# Patient Record
Sex: MALE | Race: WHITE | NOT HISPANIC OR LATINO | Employment: FULL TIME | URBAN - METROPOLITAN AREA
[De-identification: names, ages, dates, MRNs, and addresses within clinical notes are randomized per-mention and may not be internally consistent; named-entity substitution may affect disease eponyms.]

---

## 2020-12-14 ENCOUNTER — HOSPITAL ENCOUNTER (OUTPATIENT)
Facility: MEDICAL CENTER | Age: 25
End: 2020-12-14
Attending: PHYSICIAN ASSISTANT
Payer: COMMERCIAL

## 2020-12-14 ENCOUNTER — OFFICE VISIT (OUTPATIENT)
Dept: URGENT CARE | Facility: CLINIC | Age: 25
End: 2020-12-14
Payer: COMMERCIAL

## 2020-12-14 VITALS
HEIGHT: 75 IN | BODY MASS INDEX: 27.35 KG/M2 | SYSTOLIC BLOOD PRESSURE: 114 MMHG | DIASTOLIC BLOOD PRESSURE: 72 MMHG | OXYGEN SATURATION: 98 % | WEIGHT: 220 LBS | HEART RATE: 97 BPM | TEMPERATURE: 98.5 F | RESPIRATION RATE: 16 BRPM

## 2020-12-14 DIAGNOSIS — Z20.822 SUSPECTED COVID-19 VIRUS INFECTION: ICD-10-CM

## 2020-12-14 DIAGNOSIS — J98.8 VIRAL RESPIRATORY ILLNESS: ICD-10-CM

## 2020-12-14 DIAGNOSIS — Z20.822 EXPOSURE TO COVID-19 VIRUS: ICD-10-CM

## 2020-12-14 DIAGNOSIS — B97.89 VIRAL RESPIRATORY ILLNESS: ICD-10-CM

## 2020-12-14 PROCEDURE — 99203 OFFICE O/P NEW LOW 30 MIN: CPT | Mod: CS | Performed by: PHYSICIAN ASSISTANT

## 2020-12-14 PROCEDURE — U0003 INFECTIOUS AGENT DETECTION BY NUCLEIC ACID (DNA OR RNA); SEVERE ACUTE RESPIRATORY SYNDROME CORONAVIRUS 2 (SARS-COV-2) (CORONAVIRUS DISEASE [COVID-19]), AMPLIFIED PROBE TECHNIQUE, MAKING USE OF HIGH THROUGHPUT TECHNOLOGIES AS DESCRIBED BY CMS-2020-01-R: HCPCS

## 2020-12-14 RX ORDER — BENZONATATE 100 MG/1
200 CAPSULE ORAL 3 TIMES DAILY PRN
Qty: 60 CAP | Refills: 0 | Status: SHIPPED | OUTPATIENT
Start: 2020-12-14 | End: 2021-01-26

## 2020-12-14 ASSESSMENT — ENCOUNTER SYMPTOMS
CHILLS: 0
FEVER: 0
HEADACHES: 1
SORE THROAT: 1
SHORTNESS OF BREATH: 1

## 2020-12-14 NOTE — LETTER
December 14, 2020         Patient: Remington Edmonds   YOB: 1995   Date of Visit: 12/14/2020    Your employee was seen in our clinic today.  A concern for COVID-19 has been identified and testing is in progress. We are asking you to excuse absences while following self-isolation protocol per Center for Disease Control (CDC) guidelines.  Your employee will be able to access test results through our electronic delivery system called [employer].     If the results of testing are positive, your employee should follow the CDC guidelines.  These are isolation for a minimum of 10 days and at least 24 hours have passed since your last fever without the use of fever-reducing medications and all other symptoms have improved.  The health department may contact you and provide further directions regarding self-isolation and return to work.     Negative result without close contact*:  If your employee was tested due to their symptoms without close contact to someone with COVID-19 and their test is negative: your employee should not return to work or regular activities until 24 hours after symptoms fully improve. (For example, if patient feels back to normal on Tuesday, should remain isolated through Wednesday).      Negative with close contact*:  If your employee was tested due to close contact to someone, they should self isolate for 14 days after their exposure.    Negative with positive household member  If your employee was due to a positive household member they should still quarantine for a period of 14 days.  The 14 day period begins once the household member is isolated. If unable to quarantine (for example mom from infant), the CDC advises an additional 14-day quarantine period from the COVID-19 household member.   In general, repeat testing is not necessary and not offered through our [facility].     This is the only note that will be provided from [facility] for this visit.  Your employee will  require an appointment with a primary care provider if FMLA or disability forms are required.  If you have any questions please do not hesitate to call me at the phone number listed below.    Sincerely,    YVONNE Perez.-C.  [phone]

## 2020-12-14 NOTE — PATIENT INSTRUCTIONS
INSTRUCTIONS FOR COVID-19 OR ANY OTHER INFECTIOUS RESPIRATORY ILLNESSES    The Centers for Disease Control and Prevention (CDC) states that early indications for COVID-19 include cough, shortness of breath, difficulty breathing, or at least two of the following symptoms: chills, shaking with chills, muscle pain, headache, sore throat, and loss of taste or smell. Symptoms can range from mild to severe and may appear up to two weeks after exposure to the virus.    The practice of self-isolation and quarantine helps protect the public and your family by  preventing exposure to people who have or may have a contagious disease. Please follow the prevention steps below as based on CDC guidelines:    WHEN TO STOP ISOLATION: Persons with COVID-19 or any other infectious respiratory illness who have symptoms and were advised to care for themselves at home may discontinue home isolation under the following conditions:  · At least 24 hours have passed since recovery defined as resolution of fever without the use of fever-reducing medications; AND,  · Improvement in respiratory symptoms (e.g., cough, shortness of breath); AND,  · At least 10 days have passed since symptoms first appeared and have had no subsequent illness.    MONITOR YOUR SYMPTOMS: If your illness is worsening, seek prompt medical attention. If you have a medical emergency and need to call 911, notify the dispatch personnel that you have, or are being evaluated for confirmed or suspected COVID-19 or another infectious respiratory illness. Wear a facemask if possible.    ACTIVITY RESTRICTION: restrict activities outside your home, except for getting medical care. Do not go to work, school, or public areas. Avoid using public transportation, ride-sharing, or taxis.    SCHEDULED MEDICAL APPOINTMENTS: Notify your provider that you have, or are being evaluated for, confirmed or suspected COVID-19 or another infectious respiratory. This will help the healthcare  provider’s office safely take care of you and keep other people from getting exposed or infected.    FACEMASKS, when to wear: Anytime you are away from your home or around other people or pets. If you are unable to wear one, maintain a minimum of 6 feet distancing from others.    LIVING ENVIRONMENT: Stay in a separate room from other people and pets. If possible, use a separate bathroom, have someone else care for your pets and avoid sharing household items. Any items used should be washed thoroughly with soap and water. Clean all “high-touch” surfaces every day. Use a household cleaning spray or wipe, according to the label instructions. High touch surfaces include (but are not limited to) counters, tabletops, doorknobs, bathroom fixtures, toilets, phones, keyboards, tablets, and bedside tables.     HAND WASHING: Frequently wash hands with soap and water for at least 20 seconds,  especially after blowing your nose, coughing, or sneezing; going to the bathroom; before and after interacting with pets; and before and after eating or preparing food. If hands are visibly dirty use soap and water. If soap and water are not available, use an alcohol-based hand  with at least 60% alcohol. Avoid touching your eyes, nose, and mouth with unwashed hands. Cover your coughs and sneezes with a tissue. Throw used tissues in a lined trash can. Immediately wash your hands.    ACTIVE/FACILITATED SELF-MONITORING: Follow instructions provided by your local health department or health professionals, as appropriate. When working with your local health department check their available hours.    [facility]   Phone Number   [facility] [phone]   [facility] [place] [phone]   [place] [address]   [place] [phone]     IF YOU HAVE CONFIRMED POSITIVE COVID-19:    Those who have completely recovered from COVID-19 may have immune-boosting antibodies in their plasma--called “convalescent plasma”--that could be  used to treat critically ill COVID19 patients.    [facility] is excited to begin working with Sarah on collecting convalescent plasma from  people who have recovered from COVID-19 as part of a program to treat patients infected with the virus. This FDA-approved “emergency investigational new drug” is a special blood product containing antibodies that may give patients an extra boost to fight the virus.    To be eligible to donate convalescent plasma, you must have a prior COVID-19 diagnosis documented by a laboratory test (or a positive test result for SARS-CoV-2 antibodies) and meet additional eligibility requirements.    If you are interested in donating convalescent plasma or have any additional questions, please contact the [facility] Convalescent Plasma  at [phone] or via e-mail at [email or web address].

## 2020-12-14 NOTE — PROGRESS NOTES
"Subjective:     Remington Edmonds  is a 24 y.o. male who presents for Headache (positive exporsure), Pharyngitis, and Shortness of Breath    This is a new problem. Patient presents to urgent care with sudden onset this morning of sore throat and mild shortness of breath. Patient denies fever or chills, loss of taste or smell, cough, nausea, vomiting or diarrhea. Patient has had a direct exposure to a coworker who is positive for COVID-19.     Headache   Associated symptoms include a sore throat. Pertinent negatives include no fever.   Pharyngitis   Associated symptoms include headaches and shortness of breath.   Shortness of Breath  Associated symptoms include headaches and a sore throat. Pertinent negatives include no fever.         Review of Systems   Constitutional: Negative for chills, fever and malaise/fatigue.   HENT: Positive for sore throat.    Respiratory: Positive for shortness of breath.    Neurological: Positive for headaches.   All other systems reviewed and are negative.    No Known Allergies  Past medical history, family history, surgical history and social history are reviewed and updated in the record today.     Objective:   /72   Pulse 97   Temp 36.9 °C (98.5 °F) (Temporal)   Resp 16   Ht 1.905 m (6' 3\")   Wt 99.8 kg (220 lb)   SpO2 98%   BMI 27.50 kg/m²   Physical Exam  Vitals signs reviewed.   Constitutional:       Appearance: He is well-developed. He is not ill-appearing or toxic-appearing.   HENT:      Head: Normocephalic and atraumatic.      Right Ear: Tympanic membrane, ear canal and external ear normal.      Left Ear: Tympanic membrane, ear canal and external ear normal.      Nose: Nose normal.      Mouth/Throat:      Lips: Pink.      Mouth: Mucous membranes are moist.      Pharynx: Uvula midline. Posterior oropharyngeal erythema present. No oropharyngeal exudate.      Tonsils: No tonsillar exudate. 1+ on the right. 1+ on the left.   Eyes:      Conjunctiva/sclera: Conjunctivae " normal.      Pupils: Pupils are equal, round, and reactive to light.   Neck:      Musculoskeletal: Normal range of motion and neck supple.   Cardiovascular:      Rate and Rhythm: Normal rate and regular rhythm.      Heart sounds: Normal heart sounds. No murmur. No friction rub.   Pulmonary:      Effort: Pulmonary effort is normal. No respiratory distress.      Breath sounds: Normal breath sounds.   Abdominal:      General: Bowel sounds are normal.      Palpations: Abdomen is soft.      Tenderness: There is no abdominal tenderness.   Musculoskeletal: Normal range of motion.   Lymphadenopathy:      Head:      Right side of head: No submental, submandibular or tonsillar adenopathy.      Left side of head: No submental, submandibular or tonsillar adenopathy.      Cervical: No cervical adenopathy.      Upper Body:      Right upper body: No supraclavicular adenopathy.      Left upper body: No supraclavicular adenopathy.   Skin:     General: Skin is warm and dry.      Findings: No rash.   Neurological:      Mental Status: He is alert and oriented to person, place, and time.      Cranial Nerves: Cranial nerves are intact. No cranial nerve deficit.      Sensory: Sensation is intact. No sensory deficit.      Motor: Motor function is intact.      Coordination: Coordination is intact. Coordination normal.      Gait: Gait is intact.   Psychiatric:         Attention and Perception: Attention normal.         Mood and Affect: Mood normal.         Speech: Speech normal.         Behavior: Behavior normal.         Thought Content: Thought content normal.         Judgment: Judgment normal.          Assessment/Plan:   1. Viral respiratory illness  - benzonatate (TESSALON) 100 MG Cap; Take 2 Caps by mouth 3 times a day as needed.  Dispense: 60 Cap; Refill: 0  - COVID/SARS COV-2 PCR; Future    2. Suspected COVID-19 virus infection  - COVID/SARS COV-2 PCR; Future    3. Exposure to COVID-19 virus  - COVID/SARS COV-2 PCR; Future        Testing performed for COVID-19.  Patient/guardian is given printed /MyChart instructions regarding self-isolation.  Work/school note is provided with specific return to work/school protocols.  Reviewed with patient/guardian that if they do test positive they will be contacted by their local health department regarding return to work/school protocols.  Results will also be released to patient/guardian in MyChart or called to the patient/guardian directly.  Encouraged mask use, frequent handwashing, wiping down hard surfaces, etc.    Nasal saline rinse  Over-the-counter Flonase nasal spray per 's instructions  Mucinex as needed    Increase fluids, rest.  Patient may use salt water gargles, ice pops, cool fluids.    May use Tylenol or ibuprofen over-the-counter as needed for pain or fever not to exceed recommended daily dose.      Patient is given a contingency prescription for Tessalon Perles if needed for cough.    Upon entering exam room I ensured patient was wearing a mask.  This provider wore appropriate PPE throughout entire visit.  Patient wore mask entire visit except for a brief period while examining oropharynx.    Differential diagnosis, natural history, supportive care, and indications for immediate follow-up discussed.  Red flag warning symptoms and strict ER/follow-up precautions given.  The patient demonstrated a good understanding and agreed with the treatment plan.  Please note that this note was created using voice recognition speech to text software. Every effort has been made to correct obvious errors.  However, I expect there are errors of grammar and possibly context that were not discovered prior to finalizing the note  CECIL Ramos PA-C

## 2020-12-15 DIAGNOSIS — J98.8 VIRAL RESPIRATORY ILLNESS: ICD-10-CM

## 2020-12-15 DIAGNOSIS — B97.89 VIRAL RESPIRATORY ILLNESS: ICD-10-CM

## 2020-12-15 DIAGNOSIS — Z20.822 EXPOSURE TO COVID-19 VIRUS: ICD-10-CM

## 2020-12-15 DIAGNOSIS — Z20.822 SUSPECTED COVID-19 VIRUS INFECTION: ICD-10-CM

## 2020-12-15 LAB
COVID ORDER STATUS COVID19: NORMAL
SARS-COV-2 RNA RESP QL NAA+PROBE: NOTDETECTED
SPECIMEN SOURCE: NORMAL

## 2021-01-08 ENCOUNTER — OFFICE VISIT (OUTPATIENT)
Dept: URGENT CARE | Facility: CLINIC | Age: 26
End: 2021-01-08
Payer: COMMERCIAL

## 2021-01-08 VITALS
WEIGHT: 242.3 LBS | HEIGHT: 75 IN | OXYGEN SATURATION: 96 % | BODY MASS INDEX: 30.13 KG/M2 | DIASTOLIC BLOOD PRESSURE: 90 MMHG | SYSTOLIC BLOOD PRESSURE: 132 MMHG | TEMPERATURE: 98.8 F | HEART RATE: 95 BPM

## 2021-01-08 DIAGNOSIS — R07.9 CHEST PAIN OF UNKNOWN ETIOLOGY: ICD-10-CM

## 2021-01-08 DIAGNOSIS — M94.0 ACUTE COSTOCHONDRITIS: ICD-10-CM

## 2021-01-08 PROCEDURE — 99214 OFFICE O/P EST MOD 30 MIN: CPT | Performed by: NURSE PRACTITIONER

## 2021-01-08 PROCEDURE — 93000 ELECTROCARDIOGRAM COMPLETE: CPT | Performed by: NURSE PRACTITIONER

## 2021-01-08 RX ORDER — METHYLPREDNISOLONE 4 MG/1
TABLET ORAL
Qty: 21 TAB | Refills: 0 | Status: SHIPPED | OUTPATIENT
Start: 2021-01-08 | End: 2021-01-26

## 2021-01-08 SDOH — HEALTH STABILITY: MENTAL HEALTH: HOW OFTEN DO YOU HAVE A DRINK CONTAINING ALCOHOL?: 2-3 TIMES A WEEK

## 2021-01-08 ASSESSMENT — ENCOUNTER SYMPTOMS
SHORTNESS OF BREATH: 0
PALPITATIONS: 0
MYALGIAS: 0
NAUSEA: 0
COUGH: 0
WHEEZING: 0
CHILLS: 0
FEVER: 0
ORTHOPNEA: 0

## 2021-01-08 NOTE — PROGRESS NOTES
Subjective:      Remington Edmonds is a 25 y.o. male who presents with Chest Pain (started 3 weeks ago, tightness , aches, and sore to the touch in chest area on left side goes into left shoulder. goes away when excercising .)            HPI New. 25 year old male with chest wall pain on left side x 3 weeks as well as tightness that at times radiates to his left shoulder. Pain is reproducible per patient and intermittent. Describes as pressure and tightness. He denies fever, chills, other myalgia, cough or shortness of breath. He has no relevant PMH but FMH of cardiac disease in father. He has tried occasional ibuprofen with minimal change in symptoms.  Patient has no known allergies.  Current Outpatient Medications on File Prior to Visit   Medication Sig Dispense Refill   • benzonatate (TESSALON) 100 MG Cap Take 2 Caps by mouth 3 times a day as needed. (Patient not taking: Reported on 1/8/2021) 60 Cap 0     No current facility-administered medications on file prior to visit.      Social History     Socioeconomic History   • Marital status: Single     Spouse name: Not on file   • Number of children: Not on file   • Years of education: Not on file   • Highest education level: Not on file   Occupational History   • Not on file   Social Needs   • Financial resource strain: Not on file   • Food insecurity     Worry: Not on file     Inability: Not on file   • Transportation needs     Medical: Not on file     Non-medical: Not on file   Tobacco Use   • Smoking status: Never Smoker   • Smokeless tobacco: Former User     Types: Chew   Substance and Sexual Activity   • Alcohol use: Yes     Frequency: 2-3 times a week   • Drug use: Never   • Sexual activity: Not on file   Lifestyle   • Physical activity     Days per week: Not on file     Minutes per session: Not on file   • Stress: Not on file   Relationships   • Social connections     Talks on phone: Not on file     Gets together: Not on file     Attends [employer] service: Not on  "file     Active member of club or organization: Not on file     Attends meetings of clubs or organizations: Not on file     Relationship status: Not on file   • Intimate partner violence     Fear of current or ex partner: Not on file     Emotionally abused: Not on file     Physically abused: Not on file     Forced sexual activity: Not on file   Other Topics Concern   • Not on file   Social History Narrative   • Not on file     Breast Cancer-related family history is not on file.      Review of Systems   Constitutional: Negative for chills, fever and malaise/fatigue.   HENT: Negative for congestion.    Respiratory: Negative for cough, shortness of breath and wheezing.    Cardiovascular: Positive for chest pain. Negative for palpitations, orthopnea and leg swelling.   Gastrointestinal: Negative for nausea.   Musculoskeletal: Negative for myalgias.          Objective:     /90 (BP Location: Right arm, Patient Position: Sitting)   Pulse 95   Temp 37.1 °C (98.8 °F) (Temporal)   Ht 1.905 m (6' 3\")   Wt 109.9 kg (242 lb 4.8 oz)   SpO2 96%   BMI 30.29 kg/m²      Physical Exam  Vitals signs and nursing note reviewed.   Constitutional:       General: He is not in acute distress.     Appearance: Normal appearance. He is well-developed.   HENT:      Head: Normocephalic.      Right Ear: Tympanic membrane and external ear normal.      Left Ear: Tympanic membrane and external ear normal.   Eyes:      General:         Right eye: No discharge.         Left eye: No discharge.      Conjunctiva/sclera: Conjunctivae normal.   Neck:      Musculoskeletal: Normal range of motion and neck supple.   Cardiovascular:      Rate and Rhythm: Normal rate and regular rhythm.      Heart sounds: Normal heart sounds. No murmur.   Pulmonary:      Effort: Pulmonary effort is normal.      Breath sounds: Normal breath sounds.   Musculoskeletal: Normal range of motion.   Skin:     General: Skin is warm and dry.   Neurological:      Mental " Status: He is alert and oriented to person, place, and time.   Psychiatric:         Behavior: Behavior normal.         Thought Content: Thought content normal.                 Assessment/Plan:        1. Acute costochondritis  methylPREDNISolone (MEDROL DOSEPAK) 4 MG Tablet Therapy Pack   2. Chest pain of unknown etiology  EKG - Clinic Performed     EKG with sinus rhythm; rate of 67; left fascicular block; borderline t-wave abnormalities.  Reassurance. His pain is reproducible and worse with certain movements.  Trial of medrol/tylenol/icing.  Differential diagnosis, natural history, supportive care, and indications for immediate follow-up discussed at length.

## 2021-01-25 ENCOUNTER — NURSE TRIAGE (OUTPATIENT)
Dept: HEALTH INFORMATION MANAGEMENT | Facility: OTHER | Age: 26
End: 2021-01-25

## 2021-01-25 NOTE — TELEPHONE ENCOUNTER
Reason for Disposition  • All other patients with chest pain    Additional Information  • Negative: SEVERE difficulty breathing (e.g., struggling for each breath, speaks in single words)  • Negative: Passed out (i.e., fainted, collapsed and was not responding)  • Negative: Chest pain lasting longer than 5 minutes and ANY of the following:* Over 50 years old* Over 30 years old and at least one cardiac risk factor (i.e., high blood pressure, diabetes, high cholesterol, obesity, smoker or strong family history of heart disease)* Pain is crushing, pressure-like, or heavy * Took nitroglycerin and chest pain was not relieved* History of heart disease (i.e., angina, heart attack, bypass surgery, angioplasty, CHF)  • Negative: Visible sweat on face or sweat dripping down face  • Negative: Sounds like a life-threatening emergency to the triager  • Negative: Followed an injury to chest  • Negative: SEVERE chest pain  • Negative: Pain also present in shoulder(s) or arm(s) or jaw  • Negative: Difficulty breathing  • Negative: Cocaine use within last 3 days  • Negative: History of prior 'blood clot' in leg or lungs (i.e., deep vein thrombosis, pulmonary embolism)  • Negative: Recent illness requiring prolonged bed rest (i.e., immobilization)  • Negative: Hip or leg fracture in past 2 months (e.g, or had cast on leg or ankle)  • Negative: Major surgery in the past month  • Negative: Recent long-distance travel with prolonged time in car, bus, plane, or train (i.e., within past 2 weeks; 6 or more hours duration)  • Negative: Heart beating irregularly or very rapidly  • Negative: Chest pain lasting longer than 5 minutes  • Negative: Intermittent chest pain and pain has been increasing in severity or frequency  • Negative: Dizziness or lightheadedness  • Negative: Coughing up blood  • Negative: Patient sounds very sick or weak to the triager  • Negative: Fever > 100.5 F (38.1 C)  • Negative: Intermittent chest pains persist > 3  "days  • Negative: Patient wants to be seen  • Negative: Intermittent mild chest pain lasting a few seconds each time    Answer Assessment - Initial Assessment Questions  1. LOCATION: \"Where does it hurt?\"        Under left chest   2. RADIATION: \"Does the pain go anywhere else?\" (e.g., into neck, jaw, arms, back)      Sometimes to my shoulder if I am driving  3. ONSET: \"When did the chest pain begin?\" (Minutes, hours or days)       6-8 weeks ago  4. PATTERN \"Does the pain come and go, or has it been constant since it started?\"  \"Does it get worse with exertion?\"       Constant discomfort  5. DURATION: \"How long does it last\" (e.g., seconds, minutes, hours)      6-8 weeks  6. SEVERITY: \"How bad is the pain?\"  (e.g., Scale 1-10; mild, moderate, or severe)     - MILD (1-3): doesn't interfere with normal activities      - MODERATE (4-7): interferes with normal activities or awakens from sleep     - SEVERE (8-10): excruciating pain, unable to do any normal activities        Mild 2-3/10  7. CARDIAC RISK FACTORS: \"Do you have any history of heart problems or risk factors for heart disease?\" (e.g., prior heart attack, angina; high blood pressure, diabetes, being overweight, high cholesterol, smoking, or strong family history of heart disease)      Dad has high blood pressure  8. PULMONARY RISK FACTORS: \"Do you have any history of lung disease?\"  (e.g., blood clots in lung, asthma, emphysema, birth control pills)      no  9. CAUSE: \"What do you think is causing the chest pain?\"      Not sure  10. OTHER SYMPTOMS: \"Do you have any other symptoms?\" (e.g., dizziness, nausea, vomiting, sweating, fever, difficulty breathing, cough)        When he takes a deep breath= sob  11. PREGNANCY: \"Is there any chance you are pregnant?\" \"When was your last menstrual period?\"        no    Protocols used: CHEST PAIN-A-[place]    "

## 2021-01-25 NOTE — TELEPHONE ENCOUNTER
Regarding: CHEST + ABDOMINAL PAIN  ----- Message from Jamee Cavazos sent at 1/25/2021  8:15 AM PST -----  CHEST PAIN AND ABDOMINAL PAIN.    Went to urgent care 3 weeks ago for chest pain. They did an EKG, was told it was normal and was put on 7 day steroid use.  Pt states he felt better for a few days, then it started to resume. Pt also having abdominal pain on left side under rib cage. Pt states it is going lower on his waste side.    Pt states the chest pain started about 6-8 weeks ago.  Pt states it is like a pressure on the left side/ribs.  Pressure hurst more with a deep breath in. Pt states it can sometimes radiate to the shoulder.  Pt states it is a 2-3/10 pain(more of a discomfort).  Abdominal pain started about 2 weeks ago which was after the urgent care visit. Constipation for a while, now having soft stool.  Pt states abdominal pain is now lower, but it started at the bottom of the rib cage. Pt states it is tender  Pt states new stressers.  Pt does not have a PCP, pt was assisted with making an Urgent Care Visit for tomorrow.

## 2021-01-26 ENCOUNTER — OFFICE VISIT (OUTPATIENT)
Dept: URGENT CARE | Facility: CLINIC | Age: 26
End: 2021-01-26
Payer: COMMERCIAL

## 2021-01-26 ENCOUNTER — APPOINTMENT (OUTPATIENT)
Dept: RADIOLOGY | Facility: IMAGING CENTER | Age: 26
End: 2021-01-26
Attending: FAMILY MEDICINE
Payer: COMMERCIAL

## 2021-01-26 VITALS
TEMPERATURE: 97.5 F | WEIGHT: 232 LBS | DIASTOLIC BLOOD PRESSURE: 92 MMHG | OXYGEN SATURATION: 97 % | RESPIRATION RATE: 16 BRPM | BODY MASS INDEX: 28.85 KG/M2 | HEIGHT: 75 IN | SYSTOLIC BLOOD PRESSURE: 134 MMHG | HEART RATE: 85 BPM

## 2021-01-26 DIAGNOSIS — R10.12 LEFT UPPER QUADRANT ABDOMINAL PAIN: ICD-10-CM

## 2021-01-26 DIAGNOSIS — R07.89 OTHER CHEST PAIN: ICD-10-CM

## 2021-01-26 PROCEDURE — 74019 RADEX ABDOMEN 2 VIEWS: CPT | Mod: TC | Performed by: FAMILY MEDICINE

## 2021-01-26 PROCEDURE — 99213 OFFICE O/P EST LOW 20 MIN: CPT | Performed by: FAMILY MEDICINE

## 2021-01-26 PROCEDURE — 71046 X-RAY EXAM CHEST 2 VIEWS: CPT | Mod: TC | Performed by: FAMILY MEDICINE

## 2021-01-26 RX ORDER — OMEPRAZOLE 20 MG/1
20 CAPSULE, DELAYED RELEASE ORAL DAILY
COMMUNITY
End: 2021-01-26

## 2021-01-26 RX ORDER — OMEPRAZOLE 20 MG/1
20 CAPSULE, DELAYED RELEASE ORAL 2 TIMES DAILY
Qty: 60 CAP | Refills: 0 | Status: SHIPPED | OUTPATIENT
Start: 2021-01-26 | End: 2021-05-05 | Stop reason: SDUPTHER

## 2021-01-26 ASSESSMENT — ENCOUNTER SYMPTOMS
DIARRHEA: 0
COUGH: 0
SORE THROAT: 0
FEVER: 0
BLOOD IN STOOL: 0

## 2021-01-26 NOTE — PROGRESS NOTES
"Subjective:     Remington Edmonds is a 25 y.o. male who presents for Chest Pain (chest pain going on for 2 months now with abdominal pain)    HPI  Pt presents for evaluation of abdominal and chest pain  Patient has had the chest pain for about 2 months  Was seen about 3 weeks ago for this and given Medrol Dosepak for presumed costochondritis  This did help his symptoms greatly, and completely resolved for a time   However, symptoms have now returned the pas 1-2 weeks   Now having some left upper abd pain as well   Pain in chest and abd are dull and aching   Pain is not exertional and actually feels better when he is up and around at work  Notices his symptoms more at night when he lays down  Symptoms are also worse when drinking alcohol  Has history of constipation as a teenager, but does not feel too constipated lately   Has been having bowel movements about every other day which is somewhat of a decrease for him  No associated nausea or vomiting  Has history of GERD and was once prescribed omeprazole  Recently picked up omeprazole about 4 days ago and has been taking 20 mg with breakfast in the morning  Feels the omeprazole has started to help chest pains     Review of Systems   Constitutional: Negative for fever.   HENT: Negative for sore throat.    Respiratory: Negative for cough.    Gastrointestinal: Negative for blood in stool and diarrhea.   Skin: Negative for rash.     PMH:  has no past medical history of Arthritis, Diabetes (HCC), or Hypertension.  MEDS:   Current Outpatient Medications:   •  omeprazole (PRILOSEC) 20 MG delayed-release capsule, Take 20 mg by mouth every day., Disp: , Rfl:   ALLERGIES: No Known Allergies  SURGHX: History reviewed. No pertinent surgical history.  SOCHX:  reports that he has never smoked. He uses smokeless tobacco. He reports current alcohol use. He reports that he does not use drugs.     Objective:   /92   Pulse 85   Temp 36.4 °C (97.5 °F)   Resp 16   Ht 1.905 m (6' 3\") "   Wt 105.2 kg (232 lb)   SpO2 97%   BMI 29.00 kg/m²     Physical Exam  Constitutional:       General: He is not in acute distress.     Appearance: He is well-developed. He is not diaphoretic.   HENT:      Head: Normocephalic and atraumatic.   Neck:      Musculoskeletal: Normal range of motion.      Trachea: No tracheal deviation.   Cardiovascular:      Rate and Rhythm: Normal rate and regular rhythm.      Heart sounds: Normal heart sounds.      Comments: +Mild TTP along left lower anterior/lateral ribs   Pulmonary:      Effort: Pulmonary effort is normal. No respiratory distress.      Breath sounds: Normal breath sounds. No wheezing or rales.   Abdominal:      General: Abdomen is flat. Bowel sounds are normal. There is no distension.      Palpations: Abdomen is soft.      Tenderness: There is no right CVA tenderness, left CVA tenderness, guarding or rebound.      Comments: +Slight TTP in the LUQ and in periumbilical area    Musculoskeletal: Normal range of motion.   Skin:     General: Skin is warm and dry.      Findings: No rash.   Neurological:      Mental Status: He is alert.   Psychiatric:         Behavior: Behavior normal.         Thought Content: Thought content normal.         Judgment: Judgment normal.       Assessment/Plan:   Assessment    1. Other chest pain  - DX-CHEST-2 VIEWS; Future    2. Left upper quadrant abdominal pain  - AA-MYEDIUV-5 VIEWS; Future    Other orders  - omeprazole (PRILOSEC) 20 MG delayed-release capsule; Take 20 mg by mouth every day.    Patient is a 25-year-old male with 2-month history of upper abdominal/chest/rib pain.  His history is most consistent with dyspepsia/GERD as it seems to be worse when laying down and improved with omeprazole.  Obtained abdominal x-ray to evaluate for significant constipation which could be contributing, and x-ray WNL.  Chest x-ray obtained due to rib tenderness which was also within normal limits.  Have scheduled patient for a new appointment to  establish with a PCP in 3 days.  For now, will take omeprazole 20 mg twice daily before meals for the next few days until he sees PCP.  If symptoms are resolving, then dyspepsia is confirmed.  If symptoms not resolving, defer further work-up to PCP.

## 2021-01-29 ENCOUNTER — OFFICE VISIT (OUTPATIENT)
Dept: MEDICAL GROUP | Facility: PHYSICIAN GROUP | Age: 26
End: 2021-01-29
Payer: COMMERCIAL

## 2021-01-29 VITALS
HEIGHT: 75 IN | TEMPERATURE: 99.1 F | BODY MASS INDEX: 29.64 KG/M2 | HEART RATE: 70 BPM | SYSTOLIC BLOOD PRESSURE: 120 MMHG | WEIGHT: 238.4 LBS | RESPIRATION RATE: 14 BRPM | OXYGEN SATURATION: 98 % | DIASTOLIC BLOOD PRESSURE: 82 MMHG

## 2021-01-29 DIAGNOSIS — R10.12 LEFT UPPER QUADRANT ABDOMINAL PAIN: ICD-10-CM

## 2021-01-29 DIAGNOSIS — H61.22 IMPACTED CERUMEN OF LEFT EAR: ICD-10-CM

## 2021-01-29 DIAGNOSIS — Z00.00 ANNUAL PHYSICAL EXAM: ICD-10-CM

## 2021-01-29 DIAGNOSIS — L20.82 FLEXURAL ECZEMA: ICD-10-CM

## 2021-01-29 DIAGNOSIS — K21.9 GASTROESOPHAGEAL REFLUX DISEASE WITHOUT ESOPHAGITIS: ICD-10-CM

## 2021-01-29 PROBLEM — M51.26 HERNIATION OF INTERVERTEBRAL DISC BETWEEN L4 AND L5: Status: ACTIVE | Noted: 2021-01-29

## 2021-01-29 PROCEDURE — 69209 REMOVE IMPACTED EAR WAX UNI: CPT | Performed by: NURSE PRACTITIONER

## 2021-01-29 PROCEDURE — 99214 OFFICE O/P EST MOD 30 MIN: CPT | Performed by: NURSE PRACTITIONER

## 2021-01-29 SDOH — HEALTH STABILITY: MENTAL HEALTH: HOW MANY STANDARD DRINKS CONTAINING ALCOHOL DO YOU HAVE ON A TYPICAL DAY?: 3 OR 4

## 2021-01-29 SDOH — HEALTH STABILITY: MENTAL HEALTH: HOW OFTEN DO YOU HAVE 6 OR MORE DRINKS ON ONE OCCASION?: LESS THAN MONTHLY

## 2021-01-29 ASSESSMENT — ENCOUNTER SYMPTOMS
CONSTIPATION: 0
CARDIOVASCULAR NEGATIVE: 1
EYES NEGATIVE: 1
DIARRHEA: 0
HEARTBURN: 1
NAUSEA: 0
VOMITING: 0
ABDOMINAL PAIN: 1
RESPIRATORY NEGATIVE: 1
PSYCHIATRIC NEGATIVE: 1
NEUROLOGICAL NEGATIVE: 1
CONSTITUTIONAL NEGATIVE: 1
BACK PAIN: 1
BLOOD IN STOOL: 0

## 2021-01-29 ASSESSMENT — PATIENT HEALTH QUESTIONNAIRE - PHQ9: CLINICAL INTERPRETATION OF PHQ2 SCORE: 0

## 2021-01-30 NOTE — PROGRESS NOTES
Chief Complaint   Patient presents with   • Establish Care     chest and abdomen px x 2 months       Subjective:     Remington Edmonds is a 25 y.o. male presenting to establish care. 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    He did not have any surgery but did see a chiropractor for this issue as he began having nerve pain going down his left leg.  He denies any current issues at this time.  Is not taking any medications for this  He denied he ever had any bowel or bladder incontinence due to this     Left upper quadrant abdominal pain  Patient was seen in the urgent care on 1/8/2021 after complaining of CP.  He received an EKG which showed sinus rhythm.  At the time he was diagnosed with acute costochondritis and was given a Medrol Dosepak.  He then returned back to the urgent care on 1/26/2021 with continued CP as well as abd pain.  He was prescribed omeprazole at this time and told to follow-up with a primary care provider.  It was hoped at that time that the omeprazole would help with the abd pain.  Today patient complains of continued LUP abdl pain however he no longer complains of CP.     Patient reports that he does drink 6-9 beers a week 12 ounces in volume.  He has not had more than 5 alcoholic drinks in 1 sitting for more than a month.     Denies nausea, vomiting, diarrhea, constipation.  Denies chills fevers or rigors.    Denies blood, mucus, oily in stool.     Gastroesophageal reflux disease without esophagitis  He was originally diagnosed back in 2019 with GERD.  He states he was taking some medication that did help with this at that time but stopped after 2 weeks because it subsided.  He has not had any issues up until recently when he went to go visit the urgent care.      Flexural eczema  Diagnosed 2 to 3 years  "ago.  He currently uses Eucerin to help with it.  It does seem to come and go seasonal       Review of Systems   Constitutional: Negative.    HENT: Negative.    Eyes: Negative.         Baseline glasses   Respiratory: Negative.    Cardiovascular: Negative.    Gastrointestinal: Positive for abdominal pain and heartburn. Negative for blood in stool, constipation, diarrhea, nausea and vomiting.        Occasional heart burn but getting better    LUQ pain- pain when he touches it, pain after eating or drinking   Genitourinary: Negative.    Musculoskeletal: Positive for back pain.        Basline   Skin: Negative.    Neurological: Negative.    Psychiatric/Behavioral: Negative.         Review of systems:  See above.   Denies any symptoms unless previously indicated.        Current Outpatient Medications:   •  omeprazole (PRILOSEC) 20 MG delayed-release capsule, Take 1 Cap by mouth 2 times a day., Disp: 60 Cap, Rfl: 0    History reviewed. No pertinent past medical history.    History reviewed. No pertinent surgical history.    Family History   Problem Relation Age of Onset   • No Known Problems Mother    • No Known Problems Father        Patient has no known allergies.    Allergies, past medical history, past surgical history, family history, social history reviewed and updated    Objective:     Vitals: /82 (BP Location: Right arm, Patient Position: Sitting, BP Cuff Size: Adult)   Pulse 70   Temp 37.3 °C (99.1 °F) (Temporal)   Resp 14   Ht 1.905 m (6' 3\")   Wt 108 kg (238 lb 6.4 oz)   SpO2 98%   BMI 29.80 kg/m²   General: Alert, pleasant, NAD  EYES:   PERRL, EOMI, no icterus or pallor.  Conjunctivae and lids normal.   HENT:  Normocephalic.  External ears normal.  Left tympanic membrane was not visualized until cerumen removal was completed.  Bilateral tympanic membranes pearly, opaque.  No nasal drainage present.  Oropharynx non-erythematous, mucous membranes moist.  Neck supple.   No thyromegaly or masses " palpated. No cervical or supraclavicular lymphadenopathy.  Heart: Regular rate and rhythm.  S1 and S2 normal.  No murmurs appreciated.  Respiratory: Normal respiratory effort.  Clear to auscultation bilaterally.  Abdomen: Non-distended, soft, TTP to LUQ with some radiation to LLQ, no guarding/rebound. Bowel sounds present.  No hepatosplenomegaly.  No hernias.  Skin: Warm, dry, no rashes.  Musculoskeletal: Gait is normal.  Moves all extremities well.    Extremities: No clubbing, cyanosis or edema noted.   Neurological: No tremors, sensation grossly intact, tone/strength normal, gait is normal, CN2-12 intact.  Psych:  Affect/mood is normal, judgement is good, memory is intact, grooming is appropriate.    Assessment/Plan:     1. Annual physical exam  - CBC WITH DIFFERENTIAL; Future  - Comp Metabolic Panel; Future  - Lipid Profile; Future    2. Gastroesophageal reflux disease without esophagitis  - CBC WITH DIFFERENTIAL; Future  He will continue with the omeprazole    3. Left upper quadrant abdominal pain  - CBC WITH DIFFERENTIAL; Future  - LIPASE; Future  Differential diagnosis could be ulcers as well as pancreatitis or some type of blockage within the ducts.  Patient denying chills fever or rigors blood pressure stable at 120/82, heart rate is 70, respiratory rate of 14.  Patient does not look sickly at this time.    4. Herniation of intervertebral disc between L4 and L5  We will continue to monitor but he seems to be doing just fine at this time    5. Impacted cerumen of left ear    Ear Wax Removal    Date/Time: 1/29/2021 5:16 PM  Performed by: [facility]  Authorized by: Gin Cardona, A.P.R.N.     Anesthesia:  Local Anesthetic: none  Location details: left ear  Patient tolerance: patient tolerated the procedure well with no immediate complications  Procedure type: irrigation   Sedation:  Patient sedated: no            Return in about 4 weeks (around 2/26/2021) for follow-up labs and US.      Discussed  with patient possible alternative diagnoses.   Reviewed risks and benefits of treatment plan.  Patient educated on going to the ER for worsening of symptoms, such as fevers chills nausea vomiting and increased abdominal pain.  patient verbalizes understanding of all instruction and verbally agrees to plan.        Please note that this dictation was created using voice recognition software. I have made every reasonable attempt to correct obvious errors, but I expect that there are errors of grammar and possibly content that I did not discover before finalizing the note.

## 2021-01-30 NOTE — ASSESSMENT & PLAN NOTE
Diagnosed 2 to 3 years ago.  He currently uses Eucerin to help with it.  It does seem to come and go seasonal

## 2021-01-30 NOTE — ASSESSMENT & PLAN NOTE
Patient was seen in the urgent care on 1/8/2021 after complaining of CP.  He received an EKG which showed sinus rhythm.  At the time he was diagnosed with acute costochondritis and was given a Medrol Dosepak.  He then returned back to the urgent care on 1/26/2021 with continued CP as well as abd pain.  He was prescribed omeprazole at this time and told to follow-up with a primary care provider.  It was hoped at that time that the omeprazole would help with the abd pain.  Today patient complains of continued LUP abdl pain however he no longer complains of CP.     Patient reports that he does drink 6-9 beers a week 12 ounces in volume.  He has not had more than 5 alcoholic drinks in 1 sitting for more than a month.     Denies nausea, vomiting, diarrhea, constipation.  Denies chills fevers or rigors.    Denies blood, mucus, oily in stool.

## 2021-01-30 NOTE — ASSESSMENT & PLAN NOTE
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    He did not have any surgery but did see a chiropractor for this issue as he began having nerve pain going down his left leg.  He denies any current issues at this time.  Is not taking any medications for this  He denied he ever had any bowel or bladder incontinence due to this

## 2021-01-30 NOTE — ASSESSMENT & PLAN NOTE
He was originally diagnosed back in 2019 with GERD.  He states he was taking some medication that did help with this at that time but stopped after 2 weeks because it subsided.  He has not had any issues up until recently when he went to go visit the urgent care.

## 2021-01-30 NOTE — PATIENT INSTRUCTIONS
1. Get labs completed prior to our next visit.  These will be fasting labs, do not eat or drink 8 to 10 hours prior to your labs.  Make sure that you drink lots of water.  We will discuss the results of these at our next appointment.     2.  Orders for US for abd.  If you do not hear from them in the next 3-5 business days please reach out to me through [facility].

## 2021-02-05 ENCOUNTER — HOSPITAL ENCOUNTER (OUTPATIENT)
Dept: LAB | Facility: MEDICAL CENTER | Age: 26
End: 2021-02-05
Attending: NURSE PRACTITIONER
Payer: COMMERCIAL

## 2021-02-05 DIAGNOSIS — Z00.00 ANNUAL PHYSICAL EXAM: ICD-10-CM

## 2021-02-05 DIAGNOSIS — R10.12 LEFT UPPER QUADRANT ABDOMINAL PAIN: ICD-10-CM

## 2021-02-05 DIAGNOSIS — K21.9 GASTROESOPHAGEAL REFLUX DISEASE WITHOUT ESOPHAGITIS: ICD-10-CM

## 2021-02-05 LAB
ALBUMIN SERPL BCP-MCNC: 4.6 G/DL (ref 3.2–4.9)
ALBUMIN/GLOB SERPL: 1.7 G/DL
ALP SERPL-CCNC: 56 U/L (ref 30–99)
ALT SERPL-CCNC: 19 U/L (ref 2–50)
ANION GAP SERPL CALC-SCNC: 11 MMOL/L (ref 7–16)
AST SERPL-CCNC: 21 U/L (ref 12–45)
BASOPHILS # BLD AUTO: 0.6 % (ref 0–1.8)
BASOPHILS # BLD: 0.05 K/UL (ref 0–0.12)
BILIRUB SERPL-MCNC: 1.3 MG/DL (ref 0.1–1.5)
BUN SERPL-MCNC: 15 MG/DL (ref 8–22)
CALCIUM SERPL-MCNC: 10 MG/DL (ref 8.5–10.5)
CHLORIDE SERPL-SCNC: 99 MMOL/L (ref 96–112)
CHOLEST SERPL-MCNC: 173 MG/DL (ref 100–199)
CO2 SERPL-SCNC: 25 MMOL/L (ref 20–33)
CREAT SERPL-MCNC: 1.08 MG/DL (ref 0.5–1.4)
EOSINOPHIL # BLD AUTO: 0.08 K/UL (ref 0–0.51)
EOSINOPHIL NFR BLD: 1 % (ref 0–6.9)
ERYTHROCYTE [DISTWIDTH] IN BLOOD BY AUTOMATED COUNT: 38.9 FL (ref 35.9–50)
FASTING STATUS PATIENT QL REPORTED: NORMAL
GLOBULIN SER CALC-MCNC: 2.7 G/DL (ref 1.9–3.5)
GLUCOSE SERPL-MCNC: 72 MG/DL (ref 65–99)
HCT VFR BLD AUTO: 44.6 % (ref 42–52)
HDLC SERPL-MCNC: 57 MG/DL
HGB BLD-MCNC: 15.1 G/DL (ref 14–18)
IMM GRANULOCYTES # BLD AUTO: 0.03 K/UL (ref 0–0.11)
IMM GRANULOCYTES NFR BLD AUTO: 0.4 % (ref 0–0.9)
LDLC SERPL CALC-MCNC: 106 MG/DL
LIPASE SERPL-CCNC: 22 U/L (ref 11–82)
LYMPHOCYTES # BLD AUTO: 2.42 K/UL (ref 1–4.8)
LYMPHOCYTES NFR BLD: 29.7 % (ref 22–41)
MCH RBC QN AUTO: 30.8 PG (ref 27–33)
MCHC RBC AUTO-ENTMCNC: 33.9 G/DL (ref 33.7–35.3)
MCV RBC AUTO: 91 FL (ref 81.4–97.8)
MONOCYTES # BLD AUTO: 0.74 K/UL (ref 0–0.85)
MONOCYTES NFR BLD AUTO: 9.1 % (ref 0–13.4)
NEUTROPHILS # BLD AUTO: 4.83 K/UL (ref 1.82–7.42)
NEUTROPHILS NFR BLD: 59.2 % (ref 44–72)
NRBC # BLD AUTO: 0 K/UL
NRBC BLD-RTO: 0 /100 WBC
PLATELET # BLD AUTO: 272 K/UL (ref 164–446)
PMV BLD AUTO: 11.1 FL (ref 9–12.9)
POTASSIUM SERPL-SCNC: 4 MMOL/L (ref 3.6–5.5)
PROT SERPL-MCNC: 7.3 G/DL (ref 6–8.2)
RBC # BLD AUTO: 4.9 M/UL (ref 4.7–6.1)
SODIUM SERPL-SCNC: 135 MMOL/L (ref 135–145)
TRIGL SERPL-MCNC: 51 MG/DL (ref 0–149)
WBC # BLD AUTO: 8.2 K/UL (ref 4.8–10.8)

## 2021-02-05 PROCEDURE — 85025 COMPLETE CBC W/AUTO DIFF WBC: CPT

## 2021-02-05 PROCEDURE — 80053 COMPREHEN METABOLIC PANEL: CPT

## 2021-02-05 PROCEDURE — 80061 LIPID PANEL: CPT

## 2021-02-05 PROCEDURE — 36415 COLL VENOUS BLD VENIPUNCTURE: CPT

## 2021-02-05 PROCEDURE — 83690 ASSAY OF LIPASE: CPT

## 2021-02-12 ENCOUNTER — HOSPITAL ENCOUNTER (OUTPATIENT)
Dept: RADIOLOGY | Facility: MEDICAL CENTER | Age: 26
End: 2021-02-12
Attending: NURSE PRACTITIONER
Payer: COMMERCIAL

## 2021-02-12 DIAGNOSIS — R10.12 LEFT UPPER QUADRANT ABDOMINAL PAIN: ICD-10-CM

## 2021-02-12 PROCEDURE — 76700 US EXAM ABDOM COMPLETE: CPT

## 2021-02-26 ENCOUNTER — OFFICE VISIT (OUTPATIENT)
Dept: MEDICAL GROUP | Facility: PHYSICIAN GROUP | Age: 26
End: 2021-02-26
Payer: COMMERCIAL

## 2021-02-26 VITALS
HEIGHT: 75 IN | BODY MASS INDEX: 28.72 KG/M2 | DIASTOLIC BLOOD PRESSURE: 60 MMHG | WEIGHT: 231 LBS | SYSTOLIC BLOOD PRESSURE: 115 MMHG | HEART RATE: 70 BPM | TEMPERATURE: 97.7 F | RESPIRATION RATE: 16 BRPM | OXYGEN SATURATION: 96 %

## 2021-02-26 DIAGNOSIS — R10.12 LEFT UPPER QUADRANT ABDOMINAL PAIN: ICD-10-CM

## 2021-02-26 DIAGNOSIS — M51.26 HERNIATION OF INTERVERTEBRAL DISC BETWEEN L4 AND L5: ICD-10-CM

## 2021-02-26 PROCEDURE — 99214 OFFICE O/P EST MOD 30 MIN: CPT | Performed by: NURSE PRACTITIONER

## 2021-02-26 ASSESSMENT — ENCOUNTER SYMPTOMS
CONSTIPATION: 0
CARDIOVASCULAR NEGATIVE: 1
ABDOMINAL PAIN: 1
VOMITING: 0
HEARTBURN: 0
EYES NEGATIVE: 1
NEUROLOGICAL NEGATIVE: 1
DIARRHEA: 0
CONSTITUTIONAL NEGATIVE: 1
PSYCHIATRIC NEGATIVE: 1
BLOOD IN STOOL: 0
MUSCULOSKELETAL NEGATIVE: 1
NAUSEA: 0
RESPIRATORY NEGATIVE: 1

## 2021-02-26 ASSESSMENT — FIBROSIS 4 INDEX: FIB4 SCORE: 0.46

## 2021-02-27 NOTE — PROGRESS NOTES
CC:  Chief Complaint   Patient presents with   • Lab Results       HISTORY OF PRESENT ILLNESS: Patient is a 25 y.o. male established patient presenting to discuss labs and follow up on:      Left upper quadrant abdominal pain  Undiagnosed new problem uncertain prognosis.    Patient was added on Prilosec when he was seen in the ER mid July.  He has been taking Prilosec 2 times daily.  He does state that since January for appointment it has gotten better but some of the discomfort still lingers.  He did notice the other day that he did not take his Prilosec and he noticed more pain at that point.  Denies blood in his stool, nausea vomiting, pain with having a bowel movement.      Herniation of intervertebral disc between L4 and L5  Chronic and stable problem.  Patient states he is having similar symptoms that he had down his left side he is having down his right side now.  he states that it is mostly his second toe that is has feelings of tingling when he is at work walking or standing.  He states that sometimes it can become pretty constant throughout the day other times he can last just for a few minutes until he can attempt to realign himself.  States he has been working out more. Denies increased pain with workouts or squats.  He denies sciatic like pain, which he has had before.       Patient Active Problem List    Diagnosis Date Noted   • Gastroesophageal reflux disease without esophagitis 01/29/2021   • Left upper quadrant abdominal pain 01/29/2021   • Herniation of intervertebral disc between L4 and L5 01/29/2021   • Flexural eczema 01/29/2021      Allergies:Patient has no known allergies.    Current Outpatient Medications   Medication Sig Dispense Refill   • omeprazole (PRILOSEC) 20 MG delayed-release capsule Take 1 Cap by mouth 2 times a day. 60 Cap 0     No current facility-administered medications for this visit.       Social History     Tobacco Use   • Smoking status: Never Smoker   • Smokeless tobacco:  "Former User     Types: Chew   Substance Use Topics   • Alcohol use: Yes     Alcohol/week: 5.4 oz     Types: 9 Cans of beer per week     Comment: 7-8 in total for the week    • Drug use: Never     Social History     Social History Narrative   • Not on file       Family History   Problem Relation Age of Onset   • No Known Problems Mother    • No Known Problems Father         Review of Systems   Constitutional: Negative.    HENT: Negative.    Eyes: Negative.    Respiratory: Negative.    Cardiovascular: Negative.    Gastrointestinal: Positive for abdominal pain. Negative for blood in stool, constipation, diarrhea, heartburn, nausea and vomiting.   Genitourinary: Negative.    Musculoskeletal: Negative.         Patient states that he is concerned that it looks like his collarbone is higher on the left than the right.  He denies any trauma.   Skin: Negative.    Neurological: Negative.    Psychiatric/Behavioral: Negative.              - NOTE: All other systems reviewed and are negative, except as in HPI.      Exam:    /60 (BP Location: Left arm, Patient Position: Sitting, BP Cuff Size: Adult)   Pulse 70   Temp 36.5 °C (97.7 °F) (Temporal)   Resp 16   Ht 1.905 m (6' 3\")   Wt 105 kg (231 lb)   SpO2 96%  Body mass index is 28.87 kg/m².    General: Alert, pleasant, NAD  EYES:   PERRL, EOMI, no icterus or pallor.  Conjunctivae and lids normal.   HENT:  Normocephalic.  External ears normal. Neck supple.   No thyromegaly or masses palpated. No cervical or supraclavicular lymphadenopathy.  Heart: Regular rate and rhythm.  S1 and S2 normal.  No murmurs appreciated.  Respiratory: Normal respiratory effort.  Clear to auscultation bilaterally.  Abdomen: Non-distended, soft, non-tender, no guarding/rebound. Bowel sounds present.  No hepatosplenomegaly.  No hernias.  Skin: Warm, dry, no rashes.  Musculoskeletal: Gait is normal.  Moves all extremities well.  Straight leg test negative.  Collarbone on his left does seem to be " elevated more than his right.  No crepitus, or pain with palpation.   Extremities: No clubbing, cyanosis or edema noted.   Neurological: No tremors, sensation grossly intact, tone/strength normal, gait is normal, CN2-12 intact.  Psych:  Affect/mood is normal, judgement is good, memory is intact, grooming is appropriate.      LABS: CBC, CMP, Lipid profile, Lipase and US. Results reviewed and discussed with the patient, questions answered.    Assessment/Plan:  1. Left upper quadrant abdominal pain  - H. PYLORI BREATH TEST   Abd US IMPRESSION: 2/12/2021  1.  No evidence of gallstone or biliary ductal dilatation.  2.  Upper normal splenic size.  Discussed with patient options for testing or possible referral to GI.  At this time patient is willing to do the H. pylori breath test and we can assess the results at that point to see if we need to go to GI.  Patient was given instructions on how to prepare for his H. pylori breath test.  We will schedule patient another appointment as soon as his H. pylori test come back.    2. Herniation of intervertebral disc between L4 and L5  At this time patient is declining muscle relaxer, steroids, physical therapy referral.  Patient states that he is going to attempt to see a chiropractor and see if he can get with some realignment.  Due to his abdominal issues we will not be taking ibuprofen to help with inflammation.  Mala with him also to use heat pack.  I do believe that this is more of a nerve inflammation than a muscle strain.    Discussed with patient possible alternative diagnoses.     Reviewed risks and benefits of treatment plan. Patient verbalizes understanding of all instruction and verbally agrees to plan.      Return in about 1 year (around 2/26/2022) for annual .    My total time spent caring for the patient on the day of the encounter was 30 minutes.   This does not include time spent on separately billable procedures/tests.     Please note that this dictation was  created using voice recognition software. I have made every reasonable attempt to correct obvious errors, but I expect that there are errors of grammar and possibly content that I did not discover before finalizing the note.

## 2021-02-27 NOTE — ASSESSMENT & PLAN NOTE
Chronic and stable problem.  Patient states he is having similar symptoms that he had down his left side he is having down his right side now.  he states that it is mostly his second toe that is has feelings of tingling when he is at work walking or standing.  He states that sometimes it can become pretty constant throughout the day other times he can last just for a few minutes until he can attempt to realign himself.  States he has been working out more. Denies increased pain with workouts or squats.  He denies sciatic like pain, which he has had before.

## 2021-02-27 NOTE — ASSESSMENT & PLAN NOTE
Undiagnosed new problem uncertain prognosis.    Patient was added on Prilosec when he was seen in the ER mid July.  He has been taking Prilosec 2 times daily.  He does state that since January for appointment it has gotten better but some of the discomfort still lingers.  He did notice the other day that he did not take his Prilosec and he noticed more pain at that point.  Denies blood in his stool, nausea vomiting, pain with having a bowel movement.

## 2021-02-27 NOTE — PATIENT INSTRUCTIONS
Stop taking Prilosec for two weeks prior to test.   You can take medication such as zantac, Pepcid or tegretol.   You need to stop those 24-48 hours prior to your breath test.   DO NOT eat or drink anything 1 hour before your test.     Helicobacter Pylori Infection  Helicobacter pylori infection is a bacterial infection in the stomach. Long-term (chronic) infection can cause stomach irritation (gastritis), ulcers in the stomach (gastric ulcers), and ulcers in the upper part of the intestine (duodenal ulcers). Having this infection may also increase your risk of stomach cancer and a type of white blood cell cancer (lymphoma) that affects the stomach.  What are the causes?  This infection is caused by the Helicobacter pylori (H. pylori) bacteria. Many healthy people have this bacteria in their stomach lining. The bacteria may also spread from person to person through contact with stool (feces) or saliva. It is not known why some people develop ulcers, gastritis, or cancer from the bacteria.  What increases the risk?  You are more likely to develop this condition if you:  · Have family members with the infection.  · Live with many other people, such as in a dormitory.  · Are of , , or  descent.  What are the signs or symptoms?  Most people with this infection do not have any symptoms. If you do have symptoms, they may include:  · Heartburn.  · Stomach pain.  · Nausea.  · Vomiting. The vomit may be bloody because of ulcers.  · Loss of appetite.  · Bad breath.  How is this diagnosed?  This condition may be diagnosed based on:  · Your symptoms and medical history.  · A physical exam.  · Blood tests.  · Stool tests.  · A breath test.  · A procedure that involves placing a tube with a camera on the end of it down your throat to examine your stomach and upper intestine (upper endoscopy).  · Removing and testing a tissue sample from the stomach lining (biopsy). A biopsy may be taken during an upper  endoscopy.  How is this treated?    This condition is treated by taking a combination of medicines (triple therapy) for several weeks. Triple therapy includes one medicine to reduce the amount of acid in your stomach and two types of antibiotic medicines. This treatment may reduce your risk of cancer.  You may need to be tested for H. pylori again after treatment. In some cases, the treatment may need to be repeated if your treatment did not get rid of all the bacteria.  Follow these instructions at home:    · Take over-the-counter and prescription medicines only as told by your health care provider.  · Take your antibiotics as told by your health care provider. Do not stop taking the antibiotics even if you start to feel better.  · Return to your normal activities as told by your health care provider. Ask your health care provider what activities are safe for you.  · Take steps to prevent future infections:  ? Wash your hands often with soap and water. If soap and water are not available, use hand .  ? Do not eat food or drink water that may have had contact with stool or saliva.  · Keep all follow-up visits as told by your health care provider. This is important. You may need tests to make sure your treatment worked.  Contact a health care provider if your symptoms:  · Do not get better with treatment.  · Return after treatment.  Summary  · Helicobacter pylori infection is a stomach infection caused by the Helicobacter pylori (H. pylori) bacteria.  · This infection can cause stomach irritation (gastritis), ulcers in the stomach (gastric ulcers), and ulcers in the upper part of the intestine (duodenal ulcers).  · This condition is treated by taking a combination of medicines (triple therapy) for several weeks.  · Take your antibiotics as told by your health care provider. Do not stop taking the antibiotics even if you start to feel better.  This information is not intended to replace advice given to you by  your health care provider. Make sure you discuss any questions you have with your health care provider.  Document Released: 04/10/2017 Document Revised: 04/09/2020 Document Reviewed: 12/11/2018  Elsevier Patient Education © 2020 [employer]

## 2021-03-15 ENCOUNTER — HOSPITAL ENCOUNTER (OUTPATIENT)
Dept: LAB | Facility: MEDICAL CENTER | Age: 26
End: 2021-03-15
Attending: NURSE PRACTITIONER
Payer: COMMERCIAL

## 2021-03-15 PROCEDURE — 83013 H PYLORI (C-13) BREATH: CPT

## 2021-03-17 LAB — UREA BREATH TEST QL: NEGATIVE

## 2021-05-05 ENCOUNTER — TELEMEDICINE (OUTPATIENT)
Dept: MEDICAL GROUP | Facility: PHYSICIAN GROUP | Age: 26
End: 2021-05-05
Payer: COMMERCIAL

## 2021-05-05 DIAGNOSIS — K21.9 GASTROESOPHAGEAL REFLUX DISEASE WITHOUT ESOPHAGITIS: ICD-10-CM

## 2021-05-05 DIAGNOSIS — M51.26 HERNIATION OF INTERVERTEBRAL DISC BETWEEN L4 AND L5: ICD-10-CM

## 2021-05-05 DIAGNOSIS — R07.89 OTHER CHEST PAIN: ICD-10-CM

## 2021-05-05 PROCEDURE — 99214 OFFICE O/P EST MOD 30 MIN: CPT | Mod: 95,CR | Performed by: PHYSICIAN ASSISTANT

## 2021-05-05 RX ORDER — OMEPRAZOLE 20 MG/1
20 CAPSULE, DELAYED RELEASE ORAL 2 TIMES DAILY
Qty: 180 CAPSULE | Refills: 1 | Status: SHIPPED
Start: 2021-05-05 | End: 2021-07-13

## 2021-05-05 NOTE — PROGRESS NOTES
Telemedicine Visit: Established Patient     This encounter was conducted via Zoom.   Verbal consent was obtained. Patient's identity was verified.    CC:  Chief Complaint   Patient presents with   • Chest Pain     due to acid reflux, family hx of heart conditions    • Follow-Up     white spots in mouth, oral hpv. partners in the past had hpv x 2-3 days ago   • Follow-Up     recommendations for chiropractors        HISTORY OF PRESENT ILLNESS: Patient is a 25 y.o. male established patient presenting with several issues.   1. Pt having increased trouble with acid reflux. Thinks it might be anxiety or stress related. Just lost his father last week 2/2 thoracic aortic dissection. Pt's grandmother also had aortic dissection. Father was 64 yo. Has uncle with Marfan syndrome.  2. Pt has 3 white spots on his gums. Had a sexual partner in the past who had HPV. Never had HPV vaccine. Used chewing tobacco for a few years. Just noticed them a few days ago. Just had dentist look at them and didn't seem concerned. Not ulcerated. Not painful or tender.   3. Pt herniated his L4-L5 lumbar disc when he was playing baseball in college. Having recurring pain from it.     No problem-specific Assessment & Plan notes found for this encounter.      Patient Active Problem List    Diagnosis Date Noted   • Gastroesophageal reflux disease without esophagitis 01/29/2021   • Left upper quadrant abdominal pain 01/29/2021   • Herniation of intervertebral disc between L4 and L5 01/29/2021   • Flexural eczema 01/29/2021      Allergies:Patient has no known allergies.    Current Outpatient Medications   Medication Sig Dispense Refill   • omeprazole (PRILOSEC) 20 MG delayed-release capsule Take 1 Cap by mouth 2 times a day. 60 Cap 0     No current facility-administered medications for this visit.       Social History     Tobacco Use   • Smoking status: Never Smoker   • Smokeless tobacco: Former User     Types: Chew   Substance Use Topics   • Alcohol  use: Yes     Alcohol/week: 5.4 oz     Types: 9 Cans of beer per week     Comment: 7-8 in total for the week    • Drug use: Never     Social History     Social History Narrative   • Not on file       Family History   Problem Relation Age of Onset   • No Known Problems Mother    • No Known Problems Father         ROS:     - Constitutional:  Negative for fever, chills, unexpected weight change, and fatigue/generalized weakness.    - HEENT:  Negative for headaches, vision changes, hearing changes, ear pain, ear discharge, rhinorrhea, sinus congestion, sore throat, and neck pain.      - Respiratory: Negative for cough, sputum production, chest congestion, dyspnea, wheezing, and crackles.      - Cardiovascular: Positive for chest pain.  Negative for palpitations, orthopnea, and bilateral lower extremity edema.     - Gastrointestinal: Positive heartburn.  Negative for nausea, vomiting, abdominal pain, hematochezia, melena, diarrhea, constipation, and greasy/foul-smelling stools.     - Musculoskeletal: Positive for back pain.  Negative for myalgias,  and joint pain.     - Skin: Positive for oral lesions.  Negative for rash, itching, cyanotic skin color change.     - Neurological: Negative for dizziness, tingling, tremors, focal sensory deficit, focal weakness and headaches.             - NOTE: All other systems reviewed and are negative, except as in HPI.      Exam:    There were no vitals taken for this visit. There is no height or weight on file to calculate BMI.    General:  Well nourished, well developed male in NAD  Head is grossly normal.  Neck: Supple.   Pulmonary: No accessory muscle use  Skin: No apparent lesions  Neuro: Alert and oriented  Psych: normal mood and affect    Please note that this dictation was created using voice recognition software. I have made every reasonable attempt to correct obvious errors, but I expect that there are errors of grammar and possibly content that I did not discover before  finalizing the note.        Assessment/Plan:  1. Gastroesophageal reflux disease without esophagitis  Prescription for Prilosec placed.  - omeprazole (PRILOSEC) 20 MG delayed-release capsule; Take 1 capsule by mouth 2 times a day for 180 days.  Dispense: 180 capsule; Refill: 1    2. Herniation of intervertebral disc between L4 and L5  We will set him up with physical therapy to try to strengthen the muscles around his lower back.  This worked well for him in the past so we will continue to monitor  - REFERRAL TO PHYSICAL THERAPY    3. Other chest pain  Going to set up with echocardiogram and will follow up with results when available.  - EC-ECHOCARDIOGRAM COMPLETE W/ CONT; Future

## 2021-05-10 ENCOUNTER — OFFICE VISIT (OUTPATIENT)
Dept: MEDICAL GROUP | Facility: PHYSICIAN GROUP | Age: 26
End: 2021-05-10
Payer: COMMERCIAL

## 2021-05-10 VITALS
OXYGEN SATURATION: 95 % | TEMPERATURE: 96.6 F | SYSTOLIC BLOOD PRESSURE: 130 MMHG | WEIGHT: 224.2 LBS | HEART RATE: 81 BPM | HEIGHT: 75 IN | BODY MASS INDEX: 27.88 KG/M2 | DIASTOLIC BLOOD PRESSURE: 80 MMHG | RESPIRATION RATE: 14 BRPM

## 2021-05-10 DIAGNOSIS — Z23 NEED FOR VACCINATION: ICD-10-CM

## 2021-05-10 DIAGNOSIS — M27.0 TORUS MANDIBULARIS: ICD-10-CM

## 2021-05-10 DIAGNOSIS — H60.332 ACUTE SWIMMER'S EAR OF LEFT SIDE: ICD-10-CM

## 2021-05-10 PROCEDURE — 99213 OFFICE O/P EST LOW 20 MIN: CPT | Mod: 25 | Performed by: PHYSICIAN ASSISTANT

## 2021-05-10 PROCEDURE — 90471 IMMUNIZATION ADMIN: CPT | Performed by: PHYSICIAN ASSISTANT

## 2021-05-10 PROCEDURE — 90651 9VHPV VACCINE 2/3 DOSE IM: CPT | Performed by: PHYSICIAN ASSISTANT

## 2021-05-10 ASSESSMENT — FIBROSIS 4 INDEX: FIB4 SCORE: 0.46

## 2021-05-10 NOTE — PROGRESS NOTES
CC:  Chief Complaint   Patient presents with   • Follow-Up     possible ear infection, x wk ago, symptoms worse x 3 days ago       HISTORY OF PRESENT ILLNESS: Patient is a 25 y.o. male established patient presenting with several issues.   1. Pt having GERD which he feels is triggering L ear pain. Started 7-10 days ago with ear fullness. About 3 days ago it started to become painful. Having L lymph node tenderness. Having sore throat. No fevers, chills, cough, congestion.  2. Pt questioning whether he has marfan syndrome. His uncle was diagnosed with it and his father recently  of an aortic dissection.  Patient has never had any testing for it.  He has echocardiogram scheduled in 2 months.  3. Patient would like to have them bumps in his mouth checked out.  Not painful.  Just noticed them incidentally.    No problem-specific Assessment & Plan notes found for this encounter.      Patient Active Problem List    Diagnosis Date Noted   • Gastroesophageal reflux disease without esophagitis 2021   • Left upper quadrant abdominal pain 2021   • Herniation of intervertebral disc between L4 and L5 2021   • Flexural eczema 2021      Allergies:Patient has no known allergies.    Current Outpatient Medications   Medication Sig Dispense Refill   • omeprazole (PRILOSEC) 20 MG delayed-release capsule Take 1 capsule by mouth 2 times a day for 180 days. 180 capsule 1     No current facility-administered medications for this visit.       Social History     Tobacco Use   • Smoking status: Never Smoker   • Smokeless tobacco: Former User     Types: Chew   Substance Use Topics   • Alcohol use: Yes     Alcohol/week: 5.4 oz     Types: 9 Cans of beer per week     Comment: 7-8 in total for the week    • Drug use: Never     Social History     Social History Narrative   • Not on file       Family History   Problem Relation Age of Onset   • No Known Problems Mother    • No Known Problems Father         ROS:     -  "Constitutional:  Negative for fever, chills, unexpected weight change, and fatigue/generalized weakness.    - HEENT: Positive for left ear pain and sore throat.  Negative for headaches, vision changes, hearing changes,, ear discharge, rhinorrhea, sinus congestion,and neck pain.      - Respiratory: Negative for cough, sputum production, chest congestion, dyspnea, wheezing, and crackles.      - Cardiovascular: Negative for chest pain, palpitations, orthopnea, and bilateral lower extremity edema.     - Gastrointestinal: Positive for heartburn.  Negative for  nausea, vomiting, abdominal pain, hematochezia, melena, diarrhea, constipation, and greasy/foul-smelling stools.     - Musculoskeletal: Negative for myalgias, back pain, and joint pain.     .          - NOTE: All other systems reviewed and are negative, except as in HPI.      Exam:    /80   Pulse 81   Temp 35.9 °C (96.6 °F) (Temporal)   Resp 14   Ht 1.905 m (6' 3\")   Wt 102 kg (224 lb 3.2 oz)   SpO2 95%  Body mass index is 28.02 kg/m².    General:  Well nourished, well developed male in NAD  HEENT: head is grossly normal, left ear tympanic membrane is normal but there is slight erythema and swelling of left ear canal.  Pharynx is normal and tonsils are not enlarged and are without exudate.  Neck: Supple. Thyroid is not enlarged.  Pulmonary: Clear to auscultation. No ronchi, wheezing or rales. No chest wall deformity  Cardiac: Regular rate and rhythm. No murmurs.  Musculoskeletal: Hands are without laxity and fingers do not reach around his forearm when grasped.  Foot exam is normal.  Skin: Warm and dry. No obvious lesions  Neuro: Normal muscle tone. Gait normal. Alert and oriented.  Psych: Normal mood and affect      Please note that this dictation was created using voice recognition software. I have made every reasonable attempt to correct obvious errors, but I expect that there are errors of grammar and possibly content that I did not discover before " finalizing the note.        Assessment/Plan:  1. Need for vaccination    - 9VHPV Vaccine 2-3 Dose (GARDASIL 9)    2. Acute swimmer's ear of left side  Apply drops. Return to office if no improvement  - neomycin sulf/polymyx B sulf/HC soln (CORTISPORIN HC SOL) [id number] Solution; Administer 4 Drops into affected ear(s) 3 times a day for 5 days. 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 Reassurance.

## 2021-05-13 ENCOUNTER — OFFICE VISIT (OUTPATIENT)
Dept: URGENT CARE | Facility: CLINIC | Age: 26
End: 2021-05-13
Payer: COMMERCIAL

## 2021-05-13 VITALS
BODY MASS INDEX: 27.83 KG/M2 | RESPIRATION RATE: 16 BRPM | OXYGEN SATURATION: 97 % | HEIGHT: 75 IN | TEMPERATURE: 99.4 F | SYSTOLIC BLOOD PRESSURE: 122 MMHG | WEIGHT: 223.8 LBS | HEART RATE: 63 BPM | DIASTOLIC BLOOD PRESSURE: 84 MMHG

## 2021-05-13 DIAGNOSIS — K05.10 GINGIVITIS: ICD-10-CM

## 2021-05-13 DIAGNOSIS — K13.70 ORAL LESION: ICD-10-CM

## 2021-05-13 PROCEDURE — 99213 OFFICE O/P EST LOW 20 MIN: CPT | Performed by: PHYSICIAN ASSISTANT

## 2021-05-13 RX ORDER — CHLORHEXIDINE GLUCONATE ORAL RINSE 1.2 MG/ML
15 SOLUTION DENTAL 2 TIMES DAILY
Qty: 300 ML | Refills: 0 | Status: SHIPPED | OUTPATIENT
Start: 2021-05-13 | End: 2021-05-23

## 2021-05-13 ASSESSMENT — ENCOUNTER SYMPTOMS
SINUS PAIN: 0
CHILLS: 0
FEVER: 0
SHORTNESS OF BREATH: 0
SORE THROAT: 0
MYALGIAS: 0

## 2021-05-13 ASSESSMENT — FIBROSIS 4 INDEX: FIB4 SCORE: 0.46

## 2021-05-14 NOTE — PROGRESS NOTES
"Subjective:   Remington Edmonds is a 25 y.o. male who presents for Mouth Lesions      Oral Pain  This is a new problem. The current episode started more than 1 month ago. The problem has been unchanged. Pertinent negatives include no chest pain, chills, fever, myalgias or sore throat. Nothing aggravates the symptoms. He has tried nothing for the symptoms.       Review of Systems   Constitutional: Negative for chills and fever.   HENT: Negative for ear pain, sinus pain and sore throat.         Gum pain   Respiratory: Negative for shortness of breath.    Cardiovascular: Negative for chest pain.   Musculoskeletal: Negative for myalgias.       Medications:    • neomycin sulf/polymyx B sulf/HC soln Soln  • omeprazole    Allergies: Patient has no known allergies.    Problem List: Remington Edomnds does not have any pertinent problems on file.    Surgical History:  No past surgical history on file.    Past Social Hx: Remington Edmonds  reports that he has quit smoking. He quit smokeless tobacco use about 2 years ago.  His smokeless tobacco use included chew. He reports current alcohol use of about 5.4 oz of alcohol per week. He reports that he does not use drugs.     Past Family Hx:  Remington Edmonds family history includes No Known Problems in his father and mother.     Problem list, medications, and allergies reviewed by myself today in Epic.     Objective:     Blood Pressure 122/84 (BP Location: Left arm, Patient Position: Sitting, BP Cuff Size: Adult)   Pulse 63   Temperature 37.4 °C (99.4 °F) (Temporal)   Respiration 16   Height 1.905 m (6' 3\")   Weight 102 kg (223 lb 12.8 oz)   Oxygen Saturation 97%   Body Mass Index 27.97 kg/m²     Physical Exam  Vitals reviewed.   Constitutional:       Appearance: He is well-developed.   HENT:      Head: Normocephalic.      Jaw: No trismus.      Right Ear: External ear normal.      Left Ear: External ear normal.      Nose: Nose normal.      Mouth/Throat:      Dentition: Gum lesions " present.      Pharynx: Uvula midline. No oropharyngeal exudate or posterior oropharyngeal erythema.      Tonsils: No tonsillar abscesses.   Eyes:      Conjunctiva/sclera: Conjunctivae normal.   Neck:      Thyroid: No thyromegaly.      Vascular: No JVD.      Trachea: No tracheal deviation.   Cardiovascular:      Rate and Rhythm: Normal rate and regular rhythm.      Heart sounds: Normal heart sounds.   Pulmonary:      Effort: Pulmonary effort is normal.      Breath sounds: Normal breath sounds. No stridor.   Musculoskeletal:      Cervical back: Normal range of motion and neck supple.   Lymphadenopathy:      Cervical: No cervical adenopathy.   Skin:     Findings: No erythema.   Neurological:      Mental Status: He is alert and oriented to person, place, and time.   Psychiatric:         Behavior: Behavior normal.         Thought Content: Thought content normal.         Judgment: Judgment normal.         Assessment/Plan:     Medical Decision Making/Comments   Patient is a 25-year-old male who presents for evaluation of oral pain.  He states that he has had a lesion in his right upper gums for several months.  He reports it is nonpainful.  He is extremely worried that this may be oral cancer because he is a former tobacco user.  Vital signs within normal limits exam is unremarkable mild inflammation of the anterior gumline with no signs of leukoplakia or mass.  We will treat for possible gingivitis and refer to ENT for further evaluation and treatment.     Diagnosis and associated orders     1. Oral lesion  REFERRAL TO ENT   2. Gingivitis  chlorhexidine (PERIDEX) 0.12 % Solution              Differential diagnosis, natural history, supportive care, and indications for immediate follow-up discussed.    Advised the patient to follow-up with the primary care physician for recheck, reevaluation, and consideration of further management.    Please note that this dictation was created using voice recognition software. I have  made a reasonable attempt to correct obvious errors, but I expect that there are errors of grammar and possibly content that I did not discover before finalizing the note.

## 2021-05-19 ENCOUNTER — OFFICE VISIT (OUTPATIENT)
Dept: URGENT CARE | Facility: CLINIC | Age: 26
End: 2021-05-19
Payer: COMMERCIAL

## 2021-05-19 VITALS
HEART RATE: 63 BPM | TEMPERATURE: 98.5 F | WEIGHT: 222.5 LBS | RESPIRATION RATE: 18 BRPM | BODY MASS INDEX: 27.66 KG/M2 | DIASTOLIC BLOOD PRESSURE: 92 MMHG | OXYGEN SATURATION: 97 % | HEIGHT: 75 IN | SYSTOLIC BLOOD PRESSURE: 150 MMHG

## 2021-05-19 DIAGNOSIS — J31.0 RHINITIS, UNSPECIFIED TYPE: ICD-10-CM

## 2021-05-19 DIAGNOSIS — R68.84 JAW PAIN: ICD-10-CM

## 2021-05-19 DIAGNOSIS — Z71.1 PHYSICALLY WELL BUT WORRIED: ICD-10-CM

## 2021-05-19 PROCEDURE — 99214 OFFICE O/P EST MOD 30 MIN: CPT | Performed by: PHYSICIAN ASSISTANT

## 2021-05-19 ASSESSMENT — ENCOUNTER SYMPTOMS
HEADACHES: 0
DIARRHEA: 0
CHILLS: 0
COUGH: 0
SORE THROAT: 0
ABDOMINAL PAIN: 0
VOMITING: 0
MYALGIAS: 0
CONSTIPATION: 0
SHORTNESS OF BREATH: 0
NAUSEA: 0
EYE PAIN: 0
FEVER: 0
SINUS PAIN: 1

## 2021-05-19 ASSESSMENT — FIBROSIS 4 INDEX: FIB4 SCORE: 0.46

## 2021-05-19 NOTE — PROGRESS NOTES
Subjective:   Remington Edmonds is a 25 y.o. male who presents for Jaw Pain (X 3 weeks ) and Sinus Problem (X 1 week )      HPI:  This is a pleasant 25-year-old male who presents to urgent care for ongoing issues involving left-sided jaw pain, left-sided otalgia, gingival irritation as well as right greater than left sinus congestion and intermittent pressure.  Patient notes that he has had ongoing health concerns and has been hyper aware of his symptoms following the death of a family member.  He has an upcoming appointment with a dentist but expresses numerous concerns including oral cancer after a 3-year history of smokeless tobacco use, periodontal disease, jaw infection, ear infection, or other unknown etiology.    Review of Systems   Constitutional: Negative for chills, fever and malaise/fatigue.   HENT: Positive for congestion, ear pain and sinus pain. Negative for ear discharge, hearing loss, sore throat and tinnitus.    Eyes: Negative for pain.   Respiratory: Negative for cough and shortness of breath.    Cardiovascular: Negative for chest pain.   Gastrointestinal: Negative for abdominal pain, constipation, diarrhea, nausea and vomiting.   Genitourinary: Negative for dysuria.   Musculoskeletal: Negative for myalgias.   Skin: Negative for rash.   Neurological: Negative for headaches.       Medications:    • chlorhexidine Soln  • omeprazole    Allergies: Patient has no known allergies.    Problem List: Remington Edmonds has Gastroesophageal reflux disease without esophagitis; Left upper quadrant abdominal pain; Herniation of intervertebral disc between L4 and L5; Flexural eczema; and Torus mandibularis on their problem list.    Surgical History:  No past surgical history on file.    Past Social Hx: Remington Edmonds  reports that he has quit smoking. He quit smokeless tobacco use about 2 years ago.  His smokeless tobacco use included chew. He reports current alcohol use of about 5.4 oz of alcohol per week. He reports  "that he does not use drugs.     Past Family Hx:  Remington Edmonds family history includes No Known Problems in his father and mother.     Problem list, medications, and allergies reviewed by myself today in Epic.     Objective:     /92 (BP Location: Right arm, Patient Position: Sitting, BP Cuff Size: Adult)   Pulse 63   Temp 36.9 °C (98.5 °F) (Temporal)   Resp 18   Ht 1.905 m (6' 3\")   Wt 101 kg (222 lb 8 oz)   SpO2 97%   BMI 27.81 kg/m²     Physical Exam  Vitals reviewed.   Constitutional:       Appearance: Normal appearance. He is not ill-appearing or toxic-appearing.   HENT:      Head: Normocephalic and atraumatic.      Comments: Mild tenderness to palpation left TMJ, no crepitus     Right Ear: Tympanic membrane, ear canal and external ear normal.      Left Ear: Tympanic membrane, ear canal and external ear normal.      Nose: Rhinorrhea present. No congestion.      Mouth/Throat:      Mouth: Mucous membranes are moist.      Comments: Mild periodontal erythema, no palpable abscess or fluctuance  Eyes:      Conjunctiva/sclera: Conjunctivae normal.      Pupils: Pupils are equal, round, and reactive to light.   Cardiovascular:      Rate and Rhythm: Normal rate.   Pulmonary:      Effort: Pulmonary effort is normal.   Musculoskeletal:      Cervical back: Normal range of motion.   Lymphadenopathy:      Cervical: No cervical adenopathy.   Skin:     General: Skin is warm and dry.      Capillary Refill: Capillary refill takes less than 2 seconds.   Neurological:      Mental Status: He is alert and oriented to person, place, and time.         Assessment/Plan:     Diagnosis and associated orders:     1. Rhinitis, unspecified type     2. Jaw pain     3. Physically well but worried        Comments/MDM:     • Long conversation with patient regarding his symptoms.  I think the problems he is suffering from is multifactorial and underlying everything there is an element of stress and grief reaction.  Patient notes he " felt significantly better when a friend was recently in town and all his symptoms went away as this allowed him time to distract himself.  I recommended the patient follow-up with his dentist as his tooth wear pattern is consistent with my knowledge of bruxism and he has more worsening jaw pain in the morning and he may be grinding his teeth at night.  The dentist may be able to facilitate a mouthguard or further consideration of this possible diagnosis.  I believe this is contributing to his left-sided otalgia has not seen any structural problems or abnormality that would represent a outer ear or middle ear infection.  He likely has some eustachian tube dysfunction that is coincident with some allergies.  He has been taking Allegra-D and I think that the stimulating Sudafed and this is also causing more problems and I recommended a nonsedating antihistamine only without any decongestant and possible use of Flonase.  Patient was very receptive this conversation and was reassured with our findings.  He does have an upcoming appoint the dentist as well as the pending referral to ENT for him to follow-up with at his leisure.         Differential diagnosis, natural history, supportive care, and indications for immediate follow-up discussed.    Advised the patient to follow-up with the primary care physician for recheck, reevaluation, and consideration of further management.    Please note that this dictation was created using voice recognition software. I have made a reasonable attempt to correct obvious errors, but I expect that there are errors of grammar and possibly content that I did not discover before finalizing the note.    This note was electronically signed by Jamie Diaz PA-C

## 2021-07-12 ENCOUNTER — HOSPITAL ENCOUNTER (OUTPATIENT)
Dept: CARDIOLOGY | Facility: MEDICAL CENTER | Age: 26
End: 2021-07-12
Attending: PHYSICIAN ASSISTANT
Payer: COMMERCIAL

## 2021-07-12 ENCOUNTER — NON-PROVIDER VISIT (OUTPATIENT)
Dept: MEDICAL GROUP | Facility: PHYSICIAN GROUP | Age: 26
End: 2021-07-12
Payer: COMMERCIAL

## 2021-07-12 DIAGNOSIS — Z23 NEED FOR VACCINATION: ICD-10-CM

## 2021-07-12 DIAGNOSIS — R07.89 OTHER CHEST PAIN: ICD-10-CM

## 2021-07-12 LAB
LV EJECT FRACT MOD 2C 99903: 60.29
LV EJECT FRACT MOD 4C 99902: 60.88
LV EJECT FRACT MOD BP 99901: 62.12

## 2021-07-12 PROCEDURE — 93306 TTE W/DOPPLER COMPLETE: CPT | Mod: 26 | Performed by: INTERNAL MEDICINE

## 2021-07-12 PROCEDURE — 93306 TTE W/DOPPLER COMPLETE: CPT

## 2021-07-13 ENCOUNTER — TELEMEDICINE (OUTPATIENT)
Dept: MEDICAL GROUP | Facility: PHYSICIAN GROUP | Age: 26
End: 2021-07-13
Payer: COMMERCIAL

## 2021-07-13 VITALS — HEIGHT: 75 IN | BODY MASS INDEX: 24.87 KG/M2 | TEMPERATURE: 97.9 F | WEIGHT: 200 LBS

## 2021-07-13 DIAGNOSIS — Z82.49 FAMILY HISTORY OF ABDOMINAL AORTIC ANEURYSM (AAA): ICD-10-CM

## 2021-07-13 DIAGNOSIS — F41.8 ANXIETY ABOUT HEALTH: ICD-10-CM

## 2021-07-13 DIAGNOSIS — F32.A DEPRESSION, UNSPECIFIED DEPRESSION TYPE: ICD-10-CM

## 2021-07-13 DIAGNOSIS — R76.8 ANA POSITIVE: ICD-10-CM

## 2021-07-13 PROCEDURE — 99213 OFFICE O/P EST LOW 20 MIN: CPT | Performed by: NURSE PRACTITIONER

## 2021-07-13 PROCEDURE — 90651 9VHPV VACCINE 2/3 DOSE IM: CPT | Performed by: NURSE PRACTITIONER

## 2021-07-13 PROCEDURE — 90471 IMMUNIZATION ADMIN: CPT | Performed by: NURSE PRACTITIONER

## 2021-07-13 RX ORDER — CIPROFLOXACIN 500 MG/1
TABLET, FILM COATED ORAL
COMMUNITY
Start: 2021-06-04 | End: 2021-07-13

## 2021-07-13 RX ORDER — BUPROPION HYDROCHLORIDE 150 MG/1
TABLET ORAL
COMMUNITY
Start: 2021-06-11 | End: 2021-09-08

## 2021-07-13 ASSESSMENT — FIBROSIS 4 INDEX: FIB4 SCORE: 0.46

## 2021-07-13 NOTE — ASSESSMENT & PLAN NOTE
Acute uncomplicated condition.  Patient's father passed away in April of this year with a aortic dissection.  Since then patient has begun to be more anxious about his health and more aware of his past family medical history.  At times he is extremely anxious if his foot hurts or his heart monitor shows that he is in 40s for resting heart.  He did state that when a friend came from out of town to visit him he had not noticed his concerns as well as recently a family visit allowed him to also not be concerned about how his body was feeling.  He has concerns regarding his echo and would like to be seen by cardiologist to get more clarity

## 2021-07-13 NOTE — PROGRESS NOTES
"Remington Edmonds is a 25 y.o. male here for a non-provider visit for:   HPV 2 of 3    Reason for immunization: continue or complete series started at the office  Immunization records indicate need for vaccine: Yes, confirmed with Epic  Minimum interval has been met for this vaccine: Yes  ABN completed: Not Indicated    VIS Dated 10/30/2019 was given to patient: Yes  All IAC Questionnaire questions were answered \"No.\"    Patient tolerated injection and no adverse effects were observed or reported: Yes    Pt scheduled for next dose in series: Yes    "

## 2021-07-13 NOTE — PROGRESS NOTES
Virtual Visit: Established Patient   This visit was conducted via Zoom using secure and encrypted videoconferencing technology. The patient was in a private location in the [facility].    The patient's identity was confirmed and verbal consent was obtained for this virtual visit.    Subjective:   CC:   Chief Complaint   Patient presents with   • Lab Results       Remington Edmonds is a 25 y.o. male presenting for evaluation and management of:    Depression  Chronic and stable condition.    His father passed away from an aortic dissection in April 2021 unexpectedly   has had some depression and anxiety since this time   recently was seen at [facility] for some of his health concerns and was prescribed Wellbutrin.    It was noted that he had a mild fatty liver and he did not want start the medication in case that could affect his liver   Has since changed his diet and exercise routine.    Was sent to a drug and alcohol rehab therapist by [facility] which he is not comfortable seeing does not currently see anybody at this time   Patient did express desire to see a therapist to discuss how he has been doing   Patient denies SI HI       Anxiety about health  Acute uncomplicated condition.  Patient's father passed away in April of this year with a aortic dissection.  Since then patient has begun to be more anxious about his health and more aware of his past family medical history.  At times he is extremely anxious if his foot hurts or his heart monitor shows that he is in 40s for resting heart.  He did state that when a friend came from out of town to visit him he had not noticed his concerns as well as recently a family visit allowed him to also not be concerned about how his body was feeling.  He has concerns regarding his echo and would like to be seen by cardiologist to get more clarity    MARY positive  Patient was seen by an ENT who completed a rheumatoid factor, MARY testing on  "him.  His RA factor came back less than 14, his MARY came back positive however the ENT told him that this was probably due to a false positive because his sed rate was 1      ROS   Denies any recent fevers or chills. No nausea or vomiting. No chest pains or shortness of breath.     No Known Allergies    Current medicines (including changes today)  Current Outpatient Medications   Medication Sig Dispense Refill   • buPROPion (WELLBUTRIN XL) 150 MG XL tablet  (Patient not taking: Reported on 7/13/2021)       No current facility-administered medications for this visit.       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  He  has no past surgical history on file.       Objective:   Temp 36.6 °C (97.9 °F) (Temporal)   Ht 1.905 m (6' 3\")   Wt 90.7 kg (200 lb)   BMI 25.00 kg/m²     Physical Exam:  Constitutional: Alert, no distress, well-groomed.  Skin: No rashes in visible areas.  Eye: Round. Conjunctiva clear, lids normal. No icterus.   ENMT: Lips pink without lesions, good dentition, moist mucous membranes. Phonation normal.  Neck: No masses, no thyromegaly. Moves freely without pain.  Respiratory: Unlabored respiratory effort, no cough or audible wheeze  Psych: Alert and oriented x3, normal affect and mood.       Assessment and Plan:   The following treatment plan was discussed:     1. Anxiety about health  - REFERRAL TO BEHAVIORAL HEALTH  Discussed with patient at extensive length " regarding his health and possible anxiety and depression related to his father's unexpected passing.  At this time I feel that it is important that he follow-up with cardiology to get some better clarity on his echo to help with his anxiety as well as with behavioral health to build discussed his father and how he is feeling   referral to behavioral health was also sent which he is thankful for and does plan follow-up with    2. Depression, unspecified depression type  - REFERRAL TO BEHAVIORAL HEALTH    3. Family history of abdominal aortic aneurysm (AAA)  - REFERRAL TO CARDIOLOGY    4. MARY positive  ENT told pt that this could be a false positive due to sed rate being 1.   Will continue to monitor  Patient states he has follow up labs in 4 months to trend this        Follow-up: Return in about 19 weeks (around 11/23/2021).

## 2021-07-13 NOTE — ASSESSMENT & PLAN NOTE
Chronic and stable condition.    His father passed away from an aortic dissection in April 2021 unexpectedly   has had some depression and anxiety since this time   recently was seen at [facility] for some of his health concerns and was prescribed Wellbutrin.    It was noted that he had a mild fatty liver and he did not want start the medication in case that could affect his liver   Has since changed his diet and exercise routine.    Was sent to a drug and alcohol rehab therapist by [facility] which he is not comfortable seeing does not currently see anybody at this time   Patient did express desire to see a therapist to discuss how he has been doing   Patient denies SI HI

## 2021-07-13 NOTE — ASSESSMENT & PLAN NOTE
Patient was seen by an ENT who completed a rheumatoid factor, MARY testing on him.  His RA factor came back less than 14, his MARY came back positive however the ENT told him that this was probably due to a false positive because his sed rate was 1

## 2021-08-20 ENCOUNTER — OFFICE VISIT (OUTPATIENT)
Dept: CARDIOLOGY | Facility: MEDICAL CENTER | Age: 26
End: 2021-08-20
Attending: NURSE PRACTITIONER
Payer: COMMERCIAL

## 2021-08-20 VITALS
DIASTOLIC BLOOD PRESSURE: 62 MMHG | SYSTOLIC BLOOD PRESSURE: 118 MMHG | OXYGEN SATURATION: 96 % | HEART RATE: 77 BPM | WEIGHT: 203 LBS | BODY MASS INDEX: 25.24 KG/M2 | RESPIRATION RATE: 16 BRPM | HEIGHT: 75 IN

## 2021-08-20 DIAGNOSIS — R07.89 OTHER CHEST PAIN: Primary | ICD-10-CM

## 2021-08-20 DIAGNOSIS — Z82.49 FAMILY HISTORY OF AORTIC DISSECTION: ICD-10-CM

## 2021-08-20 LAB — EKG IMPRESSION: NORMAL

## 2021-08-20 PROCEDURE — 99204 OFFICE O/P NEW MOD 45 MIN: CPT | Performed by: INTERNAL MEDICINE

## 2021-08-20 PROCEDURE — 93000 ELECTROCARDIOGRAM COMPLETE: CPT | Performed by: INTERNAL MEDICINE

## 2021-08-20 RX ORDER — MULTIVIT WITH MINERALS/LUTEIN
TABLET ORAL DAILY
COMMUNITY

## 2021-08-20 RX ORDER — UBIDECARENONE 75 MG
100 CAPSULE ORAL DAILY
COMMUNITY

## 2021-08-20 RX ORDER — M-VIT,TX,IRON,MINS/CALC/FOLIC 27MG-0.4MG
1 TABLET ORAL DAILY
COMMUNITY

## 2021-08-20 RX ORDER — ASPIRIN 81 MG/1
81 TABLET, CHEWABLE ORAL DAILY
COMMUNITY

## 2021-08-20 ASSESSMENT — ENCOUNTER SYMPTOMS
NEAR-SYNCOPE: 0
LOSS OF BALANCE: 0
WHEEZING: 0
FLANK PAIN: 1
PND: 0
PALPITATIONS: 1
DOUBLE VISION: 0
BLURRED VISION: 1
VOMITING: 0
SLEEP DISTURBANCES DUE TO BREATHING: 0
BLOATING: 0
DYSPNEA ON EXERTION: 0
NUMBNESS: 0
DIARRHEA: 0
PARESTHESIAS: 0
FALLS: 0
FEVER: 0
NAUSEA: 0
LIGHT-HEADEDNESS: 0
DIZZINESS: 0
COUGH: 1
DIAPHORESIS: 0
MYALGIAS: 0
SHORTNESS OF BREATH: 0
BACK PAIN: 1
NERVOUS/ANXIOUS: 1
HEADACHES: 1
NIGHT SWEATS: 0
IRREGULAR HEARTBEAT: 0
EXCESSIVE DAYTIME SLEEPINESS: 0
DECREASED APPETITE: 0
CONSTIPATION: 0
DEPRESSION: 1
WEAKNESS: 0
ORTHOPNEA: 0
SORE THROAT: 0
SYNCOPE: 0

## 2021-08-20 ASSESSMENT — FIBROSIS 4 INDEX: FIB4 SCORE: 0.46

## 2021-08-20 NOTE — PROGRESS NOTES
Cardiology Initial Consultation Note    Date of note:    8/20/2021    Primary Care Provider: REBECCA England  Referring Provider: Gin Cardona A.P.*     Patient Name: Remington Edmonds   YOB: 1995  MRN:              [id number]    Chief Complaint   Patient presents with   • Abdominal Aortic Aneurysm     F/V Dx: Family history of AAA   • Chest Pain   • Chest Pressure       History of Present Illness: Mr. Remington Edmonds is a 25 y.o. male whose current medical problems include GERD and anxiety who is here for cardiac consultation for chest pain and family history of aortic dissection.    Patient states that he has been having chest pain since Jan 2019, went in to the ED and was diagnosed with GERD.  Was doing well and had recurrent episode of chest pain in Jan 2021 and was diagnosed with GERD and costochondritis.  After his dad passed in April 2021, he has been having persistent chest pain.    In regards to chest pain, has been constant since April, is left sided with radiation to the back and axilla.  Pain is worse with palpation, sitting in bad posture and lying down; improves with exercising and stretching.  Mostly feels like pressure/achy and is sometimes sharp.    In terms of physical activity, does cross fit 3 days/week.  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  Does stationary bike, golf and hikes on a regular basis.    Cardiovascular Risk Factors:  1. Smoking status: Former smoker, quit 2019  2. Type II Diabetes Mellitus: no   3. Hypertension: no  4. Dyslipidemia: no   Cholesterol,Tot   Date Value Ref Range Status   02/05/2021 173 100 - 199 mg/dL Final     LDL   Date Value Ref Range Status   02/05/2021 106 (H) <100 mg/dL Final     HDL   Date Value Ref Range Status   02/05/2021 57 >=40 mg/dL Final     Triglycerides   Date Value Ref Range Status   02/05/2021 51 0 - 149 mg/dL Final     5. Family history of early Coronary Artery Disease in a first  degree relative (Male less than 55 years of age; Female less than 65 years of age): Father recently  of aortic dissection at the age of 63, grandmother  of the same in her 70s  6.  Obesity and/or Metabolic Syndrome: no  7. Sedentary lifestyle: no      Review of Systems   Constitutional: Negative for decreased appetite, diaphoresis, fever, malaise/fatigue and night sweats.   HENT: Negative for congestion and sore throat.    Eyes: Positive for blurred vision. Negative for double vision.   Cardiovascular: Positive for chest pain and palpitations. Negative for cyanosis, dyspnea on exertion, irregular heartbeat, leg swelling, near-syncope, orthopnea, paroxysmal nocturnal dyspnea and syncope.   Respiratory: Positive for cough. Negative for shortness of breath, sleep disturbances due to breathing and wheezing.    Endocrine: Negative for cold intolerance and heat intolerance.   Musculoskeletal: Positive for back pain and joint pain. Negative for falls and myalgias.   Gastrointestinal: Negative for bloating, constipation, diarrhea, nausea and vomiting.   Genitourinary: Positive for flank pain. Negative for dysuria.   Neurological: Positive for headaches. Negative for excessive daytime sleepiness, dizziness, light-headedness, loss of balance, numbness, paresthesias and weakness.   Psychiatric/Behavioral: Positive for depression. The patient is nervous/anxious.    Allergic/Immunologic: Positive for environmental allergies.         History reviewed. No pertinent past medical history.      History reviewed. No pertinent surgical history.      Current Outpatient Medications   Medication Sig Dispense Refill   • aspirin (ASA) 81 MG Chew Tab chewable tablet Chew 81 mg every day.     • therapeutic multivitamin-minerals (THERAGRAN-M) Tab Take 1 Tablet by mouth every day.     • Ascorbic Acid (VITAMIN C) 1000 MG Tab Take  by mouth every day.     • cyanocobalamin (VITAMIN B-12) 100 MCG Tab Take 100 mcg by mouth every day.     •  buPROPion (WELLBUTRIN XL) 150 MG XL tablet  (Patient not taking: Reported on 7/13/2021)       No current facility-administered medications for this visit.         No Known Allergies      Family History   Problem Relation Age of Onset   • No Known Problems Mother    • Other Father          Social History     Socioeconomic History   • Marital status: Single     Spouse name: Not on file   • Number of children: Not on file   • Years of education: Not on file   • Highest education level: Not on file   Occupational History   • Not on file   Tobacco Use   • Smoking status: Former Smoker   • Smokeless tobacco: Former User     Types: Chew     Quit date: 1/29/2019   Vaping Use   • Vaping Use: Never used   Substance and Sexual Activity   • Alcohol use: Yes     Alcohol/week: 2.4 oz     Types: 4 Cans of beer per week     Comment: 2x a week   • Drug use: Yes     Types: Marijuana, Oral     Comment: 2 times a month   • Sexual activity: Not on file   Other Topics Concern   • Not on file   Social History Narrative   • Not on file     Social Determinants of Health     Financial Resource Strain:    • Difficulty of Paying Living Expenses:    Food Insecurity:    • Worried About Running Out of Food in the Last Year:    • Ran Out of Food in the Last Year:    Transportation Needs:    • Lack of Transportation (Medical):    • Lack of Transportation (Non-Medical):    Physical Activity:    • Days of Exercise per Week:    • Minutes of Exercise per Session:    Stress:    • Feeling of Stress :    Social Connections:    • Frequency of Communication with Friends and Family:    • Frequency of Social Gatherings with Friends and Family:    • Attends [employer] Services:    • Active Member of Clubs or Organizations:    • Attends Club or Organization Meetings:    • Marital Status:    Intimate Partner Violence:    • Fear of Current or Ex-Partner:    • Emotionally Abused:    • Physically Abused:    • Sexually Abused:          Physical Exam:  Ambulatory  "Vitals  /62 (BP Location: Left arm, Patient Position: Sitting, BP Cuff Size: Adult)   Pulse 77   Resp 16   Ht 1.905 m (6' 3\")   Wt 92.1 kg (203 lb)   SpO2 96%    Oxygen Therapy:  Pulse Oximetry: 96 %  BP Readings from Last 4 Encounters:   08/20/21 118/62   05/19/21 150/92   05/13/21 122/84   05/10/21 130/80       Weight/BMI: Body mass index is 25.37 kg/m².  Wt Readings from Last 4 Encounters:   08/20/21 92.1 kg (203 lb)   07/13/21 90.7 kg (200 lb)   05/19/21 101 kg (222 lb 8 oz)   05/13/21 102 kg (223 lb 12.8 oz)       General: Well appearing and in no apparent distress  Eyes: nl conjunctiva, no icteric sclera  ENT: wearing a mask, normal external appearance of ears  Neck: no visible JVP,  no carotid bruits  Lungs: normal respiratory effort, CTAB  Heart: RRR, no murmurs, no rubs or gallops,  no edema bilateral lower extremities. No LV/RV heave on cardiac palpatation. 2+ bilateral radial pulses.  2+ bilateral dp pulses.   Abdomen: soft, non tender, non distended, no masses, normal bowel sounds.  No HSM.  Extremities/MSK: no clubbing, no cyanosis  Neurological: No focal sensory deficits  Psychiatric: Appropriate affect, A/O x 3, intact judgement and insight  Skin: Warm extremities    Lab Data Review:  Lab Results   Component Value Date/Time    CHOLSTRLTOT 173 02/05/2021 03:11 PM     (H) 02/05/2021 03:11 PM    HDL 57 02/05/2021 03:11 PM    TRIGLYCERIDE 51 02/05/2021 03:11 PM       Lab Results   Component Value Date/Time    SODIUM 135 02/05/2021 03:11 PM    POTASSIUM 4.0 02/05/2021 03:11 PM    CHLORIDE 99 02/05/2021 03:11 PM    CO2 25 02/05/2021 03:11 PM    GLUCOSE 72 02/05/2021 03:11 PM    BUN 15 02/05/2021 03:11 PM    CREATININE 1.08 02/05/2021 03:11 PM     Lab Results   Component Value Date/Time    ALKPHOSPHAT 56 02/05/2021 03:11 PM    ASTSGOT 21 02/05/2021 03:11 PM    ALTSGPT 19 02/05/2021 03:11 PM    TBILIRUBIN 1.3 02/05/2021 03:11 PM      Lab Results   Component Value Date/Time    WBC 8.2 " 02/05/2021 03:11 PM       Cardiac Imaging and Procedures Review:    EKG dated 8/20/2021: My personal interpretation is sinus rhythm with heart rate 64 bpm    Echo dated 7/12/2021:   CONCLUSIONS  No prior study is available for comparison.   Normal left ventricular systolic function. Left ventricular ejection   fraction is visually estimated to be 60%.  Normal diastolic function.  Mildly dilated right ventricle. Normal right ventricular systolic function.  Normal inferior vena cava size and inspiratory collapse.  Right ventricular systolic pressure is estimated to be 28 mmHg.  Ascending aorta diameter is  2.6 cm.        Assessment & Plan     1. Other chest pain  EKG    Treadmill Stress   2. Family history of aortic dissection  CT-CTA COMPLETE THORACOABDOMINAL AORTA    Treadmill Stress         Shared Medical Decision Making:  Obtain CTA-aorta complete to evaluate for aortic aneurysm or dissection given significant family history of aortic dissection and sudden death in father and grandmother.  If results are normal, discussed that he will need repeat imaging done every 3 to 5 years to assess for aortic dilation or aneurysm given family history.    For atypical chest pain, obtain exercise treadmill stress test to rule out cardiac abnormality or arrhythmia.    Discussed echocardiogram results in detail.  Encourage patient to continue exercising on a regular basis.      All of patient's excellent questions were answered to the best of my knowledge and to his satisfaction.  It was a pleasure seeing Mr. Remington Edmonds in my clinic today.  RTC if aforementioned tests are abnormal otherwise as needed.  Patient is aware to call the cardiology clinic with any questions or concerns.      Dhruv Harvey MD  [facility]  [facility] [facility], [address]  [address] [address]  Phone: [phone]  Fax: [phone]

## 2021-09-01 ENCOUNTER — HOSPITAL ENCOUNTER (OUTPATIENT)
Dept: RADIOLOGY | Facility: MEDICAL CENTER | Age: 26
End: 2021-09-01
Attending: INTERNAL MEDICINE
Payer: COMMERCIAL

## 2021-09-01 DIAGNOSIS — R07.89 OTHER CHEST PAIN: Primary | ICD-10-CM

## 2021-09-01 PROCEDURE — 93018 CV STRESS TEST I&R ONLY: CPT | Performed by: INTERNAL MEDICINE

## 2021-09-01 PROCEDURE — 93017 CV STRESS TEST TRACING ONLY: CPT

## 2021-09-03 ENCOUNTER — HOSPITAL ENCOUNTER (OUTPATIENT)
Dept: RADIOLOGY | Facility: MEDICAL CENTER | Age: 26
End: 2021-09-03
Attending: INTERNAL MEDICINE
Payer: COMMERCIAL

## 2021-09-03 DIAGNOSIS — Z82.49 FAMILY HISTORY OF AORTIC DISSECTION: ICD-10-CM

## 2021-09-03 PROCEDURE — 700117 HCHG RX CONTRAST REV CODE 255: Performed by: INTERNAL MEDICINE

## 2021-09-03 PROCEDURE — 71275 CT ANGIOGRAPHY CHEST: CPT

## 2021-09-03 RX ADMIN — IOHEXOL 100 ML: 350 INJECTION, SOLUTION INTRAVENOUS at 15:51

## 2021-09-08 ENCOUNTER — TELEMEDICINE (OUTPATIENT)
Dept: MEDICAL GROUP | Facility: PHYSICIAN GROUP | Age: 26
End: 2021-09-08
Payer: COMMERCIAL

## 2021-09-08 DIAGNOSIS — R16.1 SPLEEN ENLARGED: ICD-10-CM

## 2021-09-08 DIAGNOSIS — F32.A DEPRESSION, UNSPECIFIED DEPRESSION TYPE: ICD-10-CM

## 2021-09-08 DIAGNOSIS — R76.8 ANA POSITIVE: ICD-10-CM

## 2021-09-08 PROCEDURE — 99213 OFFICE O/P EST LOW 20 MIN: CPT | Mod: 95 | Performed by: NURSE PRACTITIONER

## 2021-09-08 ASSESSMENT — PATIENT HEALTH QUESTIONNAIRE - PHQ9
1. LITTLE INTEREST OR PLEASURE IN DOING THINGS: NOT AT ALL
6. FEELING BAD ABOUT YOURSELF - OR THAT YOU ARE A FAILURE OR HAVE LET YOURSELF OR YOUR FAMILY DOWN: NOT AL ALL
3. TROUBLE FALLING OR STAYING ASLEEP OR SLEEPING TOO MUCH: NOT AT ALL
5. POOR APPETITE OR OVEREATING: NOT AT ALL
4. FEELING TIRED OR HAVING LITTLE ENERGY: NOT AT ALL
SUM OF ALL RESPONSES TO PHQ9 QUESTIONS 1 AND 2: 0
7. TROUBLE CONCENTRATING ON THINGS, SUCH AS READING THE NEWSPAPER OR WATCHING TELEVISION: NOT AT ALL
SUM OF ALL RESPONSES TO PHQ QUESTIONS 1-9: 0
9. THOUGHTS THAT YOU WOULD BE BETTER OFF DEAD, OR OF HURTING YOURSELF: NOT AT ALL
2. FEELING DOWN, DEPRESSED, IRRITABLE, OR HOPELESS: NOT AT ALL
8. MOVING OR SPEAKING SO SLOWLY THAT OTHER PEOPLE COULD HAVE NOTICED. OR THE OPPOSITE, BEING SO FIGETY OR RESTLESS THAT YOU HAVE BEEN MOVING AROUND A LOT MORE THAN USUAL: NOT AT ALL

## 2021-09-08 ASSESSMENT — ENCOUNTER SYMPTOMS
PALPITATIONS: 0
ABDOMINAL PAIN: 0
BLOOD IN STOOL: 0
CHILLS: 0
WEAKNESS: 0
SHORTNESS OF BREATH: 0
CONSTIPATION: 0
VOMITING: 0
FEVER: 0
COUGH: 0
DIZZINESS: 0
DIARRHEA: 0
WEIGHT LOSS: 0
NAUSEA: 0
HEADACHES: 0

## 2021-09-09 NOTE — PROGRESS NOTES
Virtual Visit: Established Patient   This visit was conducted via Zoom using secure and encrypted videoconferencing technology.   The patient was in a private location in the state of [place].    The patient's identity was confirmed and verbal consent was obtained for this virtual visit.    Subjective:   CC:   Chief Complaint   Patient presents with   • Lab Results     ct       Remington Edmonds is a 25 y.o. male presenting for evaluation and management of:    Depression  Chronic and stable condition.  Patient has been following up with [facility] for behavioral health referral and seems to be improving.    MARY positive  Patient continues to follow with ENT who is requesting new and will be following up with him on 11/2021.  Patient states that ENT believes that he might have some type of autoimmune disease     Spleen enlarged  Undiagnosed new problem with uncertain prognosis   2/12/2021 patient had a ultrasound abdomen which noted upper normal limit size of 12 cm CT abdomen  6/5/2021 unremarkable spleen.     9/3/2020 patient CTA which noted borderline splenomegaly measuring 12.4 cm in diameter  Patient was concerned due to one of the test stating accessory splenic tissue.     states that he has found new lumps on his abdomen specifically 3 to his belly lower right, 1 left-sided and one right-sided.    He also states that he has a left side lump to the bottom of his rib cage that is soft and pliable.    Denies pain, weight loss, fever, abdominal distention.      ROS   Review of Systems   Constitutional: Negative for chills, fever, malaise/fatigue and weight loss.   Respiratory: Negative for cough and shortness of breath.    Cardiovascular: Negative for chest pain and palpitations.   Gastrointestinal: Negative for abdominal pain, blood in stool, constipation, diarrhea, nausea and vomiting.   Neurological: Negative for dizziness, weakness and headaches.         Current medicines (including changes today)  Current  Outpatient Medications   Medication Sig Dispense Refill   • aspirin (ASA) 81 MG Chew Tab chewable tablet Chew 81 mg every day.     • therapeutic multivitamin-minerals (THERAGRAN-M) Tab Take 1 Tablet by mouth every day.     • Ascorbic Acid (VITAMIN C) 1000 MG Tab Take  by mouth every day.     • cyanocobalamin (VITAMIN B-12) 100 MCG Tab Take 100 mcg by mouth every day.       No current facility-administered medications for this visit.       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    Physical Exam:  Constitutional: Alert, no distress, well-groomed.  Skin: No rashes in visible areas.  Eye: Round. Conjunctiva clear, lids normal. No icterus.   ENMT: Lips pink without lesions, good dentition, moist mucous membranes. Phonation normal.  Neck: No masses, no thyromegaly. Moves freely without pain.  Respiratory: Unlabored respiratory effort, no cough or audible wheeze  Psych: Alert and oriented x3, normal affect and mood.     Assessment and Plan:   The following treatment plan was discussed:     1. Spleen enlarged  - CBC WITH DIFFERENTIAL; Future  - Comp Metabolic Panel; Future  - US-SPLEEN; Future  Discussed with patient that this could be just on the larger side of normal for him.  We will however follow-up with blood work and trend this to look for any abnormalities in his   blood cells  We will also get a ultrasound spleen to verify size and assess any abnormalities within his spleen  Due to this being a virtual visit is difficult to truly assess him and was discussed that he get his  blood work done as well as the ultrasound spleen as long as symptoms continue as they are and we can follow-up once those are completed.  If his symptoms start to worsen or he gets distention of his belly, chills, fever, abdominal pain he needs to return to the ER.  2. Depression, unspecified depression type  [facility]    Follow-up: No follow-ups on file.    My total time spent caring for the patient on the day of the encounter was 20 minutes.   This does not include time spent on separately billable procedures/tests.

## 2021-09-09 NOTE — ASSESSMENT & PLAN NOTE
Patient continues to follow with ENT who is requesting new and will be following up with him on 11/2021.  Patient states that ENT believes that he might have some type of autoimmune disease

## 2021-09-09 NOTE — ASSESSMENT & PLAN NOTE
Undiagnosed new problem with uncertain prognosis   2/12/2021 patient had a ultrasound abdomen which noted upper normal limit size of 12 cm CT abdomen  6/5/2021 unremarkable spleen.     9/3/2020 patient CTA which noted borderline splenomegaly measuring 12.4 cm in diameter  Patient was concerned due to one of the test stating accessory splenic tissue.     states that he has found new lumps on his abdomen specifically 3 to his belly lower right, 1 left-sided and one right-sided.    He also states that he has a left side lump to the bottom of his rib cage that is soft and pliable.    Denies pain, weight loss, fever, abdominal distention.

## 2021-09-09 NOTE — ASSESSMENT & PLAN NOTE
Chronic and stable condition.  Patient has been following up with [facility] for behavioral health referral and seems to be improving.

## 2021-09-14 ENCOUNTER — HOSPITAL ENCOUNTER (OUTPATIENT)
Dept: LAB | Facility: MEDICAL CENTER | Age: 26
End: 2021-09-14
Attending: NURSE PRACTITIONER
Payer: COMMERCIAL

## 2021-09-14 DIAGNOSIS — R16.1 SPLEEN ENLARGED: ICD-10-CM

## 2021-09-14 LAB
ALBUMIN SERPL BCP-MCNC: 4.4 G/DL (ref 3.2–4.9)
ALBUMIN/GLOB SERPL: 1.9 G/DL
ALP SERPL-CCNC: 72 U/L (ref 30–99)
ALT SERPL-CCNC: 14 U/L (ref 2–50)
ANION GAP SERPL CALC-SCNC: 9 MMOL/L (ref 7–16)
AST SERPL-CCNC: 18 U/L (ref 12–45)
BASOPHILS # BLD AUTO: 0.7 % (ref 0–1.8)
BASOPHILS # BLD: 0.05 K/UL (ref 0–0.12)
BILIRUB SERPL-MCNC: 0.5 MG/DL (ref 0.1–1.5)
BUN SERPL-MCNC: 16 MG/DL (ref 8–22)
CALCIUM SERPL-MCNC: 9.5 MG/DL (ref 8.5–10.5)
CHLORIDE SERPL-SCNC: 102 MMOL/L (ref 96–112)
CO2 SERPL-SCNC: 27 MMOL/L (ref 20–33)
CREAT SERPL-MCNC: 1.07 MG/DL (ref 0.5–1.4)
EOSINOPHIL # BLD AUTO: 0.14 K/UL (ref 0–0.51)
EOSINOPHIL NFR BLD: 1.8 % (ref 0–6.9)
ERYTHROCYTE [DISTWIDTH] IN BLOOD BY AUTOMATED COUNT: 41.1 FL (ref 35.9–50)
GLOBULIN SER CALC-MCNC: 2.3 G/DL (ref 1.9–3.5)
GLUCOSE SERPL-MCNC: 88 MG/DL (ref 65–99)
HCT VFR BLD AUTO: 42.7 % (ref 42–52)
HGB BLD-MCNC: 14.6 G/DL (ref 14–18)
IMM GRANULOCYTES # BLD AUTO: 0.03 K/UL (ref 0–0.11)
IMM GRANULOCYTES NFR BLD AUTO: 0.4 % (ref 0–0.9)
LYMPHOCYTES # BLD AUTO: 2.68 K/UL (ref 1–4.8)
LYMPHOCYTES NFR BLD: 35 % (ref 22–41)
MCH RBC QN AUTO: 31.5 PG (ref 27–33)
MCHC RBC AUTO-ENTMCNC: 34.2 G/DL (ref 33.7–35.3)
MCV RBC AUTO: 92 FL (ref 81.4–97.8)
MONOCYTES # BLD AUTO: 0.56 K/UL (ref 0–0.85)
MONOCYTES NFR BLD AUTO: 7.3 % (ref 0–13.4)
NEUTROPHILS # BLD AUTO: 4.19 K/UL (ref 1.82–7.42)
NEUTROPHILS NFR BLD: 54.8 % (ref 44–72)
NRBC # BLD AUTO: 0 K/UL
NRBC BLD-RTO: 0 /100 WBC
PLATELET # BLD AUTO: 272 K/UL (ref 164–446)
PMV BLD AUTO: 11.5 FL (ref 9–12.9)
POTASSIUM SERPL-SCNC: 4.3 MMOL/L (ref 3.6–5.5)
PROT SERPL-MCNC: 6.7 G/DL (ref 6–8.2)
RBC # BLD AUTO: 4.64 M/UL (ref 4.7–6.1)
SODIUM SERPL-SCNC: 138 MMOL/L (ref 135–145)
WBC # BLD AUTO: 7.7 K/UL (ref 4.8–10.8)

## 2021-09-14 PROCEDURE — 85025 COMPLETE CBC W/AUTO DIFF WBC: CPT

## 2021-09-14 PROCEDURE — 36415 COLL VENOUS BLD VENIPUNCTURE: CPT

## 2021-09-14 PROCEDURE — 80053 COMPREHEN METABOLIC PANEL: CPT

## 2021-09-21 ENCOUNTER — HOSPITAL ENCOUNTER (OUTPATIENT)
Dept: RADIOLOGY | Facility: MEDICAL CENTER | Age: 26
End: 2021-09-21
Attending: NURSE PRACTITIONER
Payer: COMMERCIAL

## 2021-09-21 DIAGNOSIS — R16.1 SPLEEN ENLARGED: ICD-10-CM

## 2021-09-21 PROCEDURE — 76705 ECHO EXAM OF ABDOMEN: CPT

## 2021-09-23 ENCOUNTER — OFFICE VISIT (OUTPATIENT)
Dept: MEDICAL GROUP | Facility: PHYSICIAN GROUP | Age: 26
End: 2021-09-23
Payer: COMMERCIAL

## 2021-09-23 ENCOUNTER — APPOINTMENT (OUTPATIENT)
Dept: MEDICAL GROUP | Facility: PHYSICIAN GROUP | Age: 26
End: 2021-09-23
Payer: COMMERCIAL

## 2021-09-23 VITALS
TEMPERATURE: 97.2 F | RESPIRATION RATE: 16 BRPM | OXYGEN SATURATION: 99 % | HEIGHT: 75 IN | DIASTOLIC BLOOD PRESSURE: 90 MMHG | HEART RATE: 70 BPM | SYSTOLIC BLOOD PRESSURE: 120 MMHG | BODY MASS INDEX: 25.56 KG/M2 | WEIGHT: 205.6 LBS

## 2021-09-23 DIAGNOSIS — R52 DIFFUSE PAIN: ICD-10-CM

## 2021-09-23 DIAGNOSIS — F32.A DEPRESSION, UNSPECIFIED DEPRESSION TYPE: ICD-10-CM

## 2021-09-23 PROCEDURE — 99214 OFFICE O/P EST MOD 30 MIN: CPT | Performed by: NURSE PRACTITIONER

## 2021-09-23 RX ORDER — AMITRIPTYLINE HYDROCHLORIDE 10 MG/1
10 TABLET, FILM COATED ORAL
Qty: 30 TABLET | Refills: 0 | Status: SHIPPED | OUTPATIENT
Start: 2021-09-23 | End: 2021-10-11 | Stop reason: SDUPTHER

## 2021-09-23 ASSESSMENT — ENCOUNTER SYMPTOMS
TREMORS: 0
TINGLING: 0
CONSTIPATION: 0
MYALGIAS: 1
DEPRESSION: 1
BACK PAIN: 1
NECK PAIN: 1
VOMITING: 0
NERVOUS/ANXIOUS: 1
CHILLS: 0
COUGH: 0
EYES NEGATIVE: 1
DIZZINESS: 0
NAUSEA: 0
WEIGHT LOSS: 1
ABDOMINAL PAIN: 0
WEAKNESS: 0
INSOMNIA: 0
PALPITATIONS: 0
HEADACHES: 1
DIARRHEA: 0
BLOOD IN STOOL: 0
SHORTNESS OF BREATH: 0
FEVER: 0

## 2021-09-23 ASSESSMENT — FIBROSIS 4 INDEX: FIB4 SCORE: 0.46

## 2021-09-24 NOTE — PROGRESS NOTES
CC:  Chief Complaint   Patient presents with   • Lab Results   • Results     ultrasound        HISTORY OF PRESENT ILLNESS: Patient is a 25 y.o. male established patient presenting follow up labs and US      Spleen enlarged  US completed for Spleen  CMP and CBC normal   IMPRESSION:  Spleen is not enlarged.        MARY positive  Has follow up labs with ENT in November    Depression  Following up with behavioral health  Feeling that it has been helping    Left upper quadrant abdominal pain  Resolved    Diffuse pain  multiple left side left discomfort, shifting right to left abd pain, Fatigue, intermittent HA   8 of 19 sites for WPI, SSI 3  Labs and diagnostics have not been able to point to a specific disease process  Has been experiencing this for the last 6 7 months      Patient Active Problem List    Diagnosis Date Noted   • Diffuse pain 09/23/2021   • Spleen enlarged 09/08/2021   • Anxiety about health 07/13/2021   • Depression 07/13/2021   • MARY positive 07/13/2021   • 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    Current Outpatient Medications   Medication Sig Dispense Refill   • amitriptyline (ELAVIL) 10 MG Tab Take 1 Tablet by mouth at bedtime. 30 Tablet 0   • aspirin (ASA) 81 MG Chew Tab chewable tablet Chew 81 mg every day.     • therapeutic multivitamin-minerals (THERAGRAN-M) Tab Take 1 Tablet by mouth every day.     • Ascorbic Acid (VITAMIN C) 1000 MG Tab Take  by mouth every day.     • cyanocobalamin (VITAMIN B-12) 100 MCG Tab Take 100 mcg by mouth every day.       No current facility-administered medications for this visit.       Social History     Tobacco Use   • Smoking status: Former Smoker   • Smokeless tobacco: Former User     Types: Chew     Quit date:  "1/29/2019   Vaping Use   • Vaping Use: Never used   Substance Use Topics   • Alcohol use: Yes     Alcohol/week: 2.4 oz     Types: 4 Cans of beer per week     Comment: 2x a week   • Drug use: Yes     Types: Marijuana, Oral     Comment: 2 times a month     Social History     Social History Narrative   • Not on file       Family History   Problem Relation Age of Onset   • No Known Problems Mother    • Other Father         Review of Systems   Constitutional: Positive for malaise/fatigue and weight loss. Negative for chills and fever.        Working on wt loss  Generalized fatigue   HENT: Negative.    Eyes: Negative.    Respiratory: Negative for cough and shortness of breath.    Cardiovascular: Positive for chest pain. Negative for palpitations and leg swelling.   Gastrointestinal: Negative for abdominal pain, blood in stool, constipation, diarrhea, nausea and vomiting.   Genitourinary: Negative for dysuria and hematuria.   Musculoskeletal: Positive for back pain, joint pain, myalgias and neck pain.        Left shoulder, left arm, left leg, left side of neck, right and left back, lower back   Skin: Negative.    Neurological: Positive for headaches. Negative for dizziness, tingling, tremors and weakness.        Intermittent last 10 seconds   Psychiatric/Behavioral: Positive for depression. Negative for suicidal ideas. The patient is nervous/anxious. The patient does not have insomnia.              - NOTE: All other systems reviewed and are negative, except as in HPI.      Exam:    /90   Pulse 70   Temp 36.2 °C (97.2 °F) (Temporal)   Resp 16   Ht 1.905 m (6' 3\")   Wt 93.3 kg (205 lb 9.6 oz)   SpO2 99%  Body mass index is 25.7 kg/m².    General: Alert, pleasant, NAD  EYES:   PERRL, EOMI, no icterus or pallor.  Conjunctivae and lids normal.   HENT:  Normocephalic.  External ears normal.   No nasal drainage present.  Oropharynx non-erythematous, mucous membranes moist.  Neck supple.   No thyromegaly or masses " palpated. No cervical or supraclavicular lymphadenopathy.  Heart: Regular rate and rhythm.  S1 and S2 normal.  No murmurs appreciated.  Respiratory: Normal respiratory effort.  Clear to auscultation bilaterally.  Abdomen: Non-distended, soft, non-tender, no guarding/rebound. Bowel sounds present.  No hepatosplenomegaly.  No hernias.  Skin: Warm, dry, no rashes.  Musculoskeletal: Gait is normal.  Moves all extremities well.    Extremities: No clubbing, cyanosis or edema noted.   Neurological: No tremors, sensation grossly intact, tone/strength normal, gait is normal, CN2-12 intact.  Psych:  Affect/mood is normal, judgement is good, memory is intact, grooming is appropriate.      LABS: 9/14/021, 9/21/2021: Results reviewed and discussed with the patient, questions answered.    Assessment/Plan:  1. Diffuse pain  - amitriptyline (ELAVIL) 10 MG Tab; Take 1 Tablet by mouth at bedtime.  Dispense: 30 Tablet; Refill: 0  - CRP HIGH SENSITIVE (CARDIAC); Future  Pt has already had ESR, RF and MARY drawn. Will check CRP.   Possible fibromyalgia   WPI- 8, SS- 3, multiple somatic complaints  Will trial elavil for fibromyalgia and/or chronic pain    2. Depression, unspecified depression type  Continue with behavioral health        Discussed with patient possible alternative diagnoses, patient is to take all medications as prescribed.     If symptoms persist FU w/PCP, if symptoms worsen go to emergency room.     If experiencing any side effects from prescribed medications reports to the office immediately or go to emergency room.    Reviewed indication, dosage, usage and potential adverse effects of prescribed medications.     Reviewed risks and benefits of treatment plan. Patient verbalizes understanding of all instruction and verbally agrees to plan.      Return in about 3 weeks (around 10/14/2021) for follow up on medication and labs.    My total time spent caring for the patient on the day of the encounter was 30 minutes.   This  does not include time spent on separately billable procedures/tests.     Please note that this dictation was created using voice recognition software. I have made every reasonable attempt to correct obvious errors, but I expect that there are errors of grammar and possibly content that I did not discover before finalizing the note.

## 2021-09-24 NOTE — ASSESSMENT & PLAN NOTE
multiple left side left discomfort, shifting right to left abd pain, Fatigue, intermittent HA   8 of 19 sites for WPI, SSI 3  Labs and diagnostics have not been able to point to a specific disease process  Has been experiencing this for the last  7 months

## 2021-10-11 ENCOUNTER — TELEMEDICINE (OUTPATIENT)
Dept: MEDICAL GROUP | Facility: PHYSICIAN GROUP | Age: 26
End: 2021-10-11
Payer: COMMERCIAL

## 2021-10-11 VITALS — HEIGHT: 75 IN | WEIGHT: 205 LBS | BODY MASS INDEX: 25.49 KG/M2

## 2021-10-11 DIAGNOSIS — R52 DIFFUSE PAIN: ICD-10-CM

## 2021-10-11 PROCEDURE — 99213 OFFICE O/P EST LOW 20 MIN: CPT | Performed by: NURSE PRACTITIONER

## 2021-10-11 RX ORDER — CHLORAL HYDRATE 500 MG
1000 CAPSULE ORAL DAILY
COMMUNITY

## 2021-10-11 RX ORDER — AMITRIPTYLINE HYDROCHLORIDE 10 MG/1
10 TABLET, FILM COATED ORAL
Qty: 90 TABLET | Refills: 0 | Status: SHIPPED | OUTPATIENT
Start: 2021-10-11

## 2021-10-11 ASSESSMENT — ENCOUNTER SYMPTOMS
ABDOMINAL PAIN: 0
INSOMNIA: 1
FEVER: 0
CHILLS: 0
WEIGHT LOSS: 0

## 2021-10-11 ASSESSMENT — FIBROSIS 4 INDEX: FIB4 SCORE: 0.46

## 2021-10-11 NOTE — PROGRESS NOTES
Virtual Visit: Established Patient   This visit was conducted via Zoom using secure and encrypted videoconferencing technology.   The patient was in a private location in the state of [place].    The patient's identity was confirmed and verbal consent was obtained for this virtual visit.    Subjective:   CC:   Chief Complaint   Patient presents with   • Medication Refill     amitriptyline       Remington Edmonds is a 25 y.o. male presenting for evaluation and management of:    Diffuse pain  Chronic and stable condition   Has noticed improvement with his sleep which in turn he has noticed improvement with his CP and abd pain  Last visit we started him on 10 mg Elavil.   Tolerating dose, denies any current SE.   Will continue.      ROS   Review of Systems   Constitutional: Negative for chills, fever and weight loss.   Cardiovascular: Negative for chest pain.   Gastrointestinal: Negative for abdominal pain.   Musculoskeletal:        Pain has seemed to improve with medication   Psychiatric/Behavioral: The patient has insomnia.         Insomnia seems to have improved with medication          Current medicines (including changes today)  Current Outpatient Medications   Medication Sig Dispense Refill   • Omega-3 Fatty Acids (FISH OIL) 1000 MG Cap capsule Take 1,000 mg by mouth every day.     • amitriptyline (ELAVIL) 10 MG Tab Take 1 Tablet by mouth at bedtime. 90 Tablet 0   • aspirin (ASA) 81 MG Chew Tab chewable tablet Chew 81 mg every day.     • therapeutic multivitamin-minerals (THERAGRAN-M) Tab Take 1 Tablet by mouth every day.     • Ascorbic Acid (VITAMIN C) 1000 MG Tab Take  by mouth every day.     • cyanocobalamin (VITAMIN B-12) 100 MCG Tab Take 100 mcg by mouth every day.       No current facility-administered medications for this visit.       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  Skin: No rashes in visible areas.  Eye: Round. Conjunctiva clear, lids normal. No icterus.   ENMT: Lips pink without lesions, good dentition, moist mucous membranes. Phonation normal.  Neck: No masses, no thyromegaly. Moves freely without pain.  Respiratory: Unlabored respiratory effort, no cough or audible wheeze  Psych: Alert and oriented x3, normal affect and mood.     Assessment and Plan:   The following treatment plan was discussed:     1. Diffuse pain  - amitriptyline (ELAVIL) 10 MG Tab; Take 1 Tablet by mouth at bedtime.  Dispense: 90 Tablet; Refill: 0  Patient tolerating medication and feeling better.   Refilled for 3 months.  Patient would like to keep November appt.    Follow-up: Return in about 6 weeks (around 11/23/2021) for HPV dose? .           "

## 2021-10-11 NOTE — ASSESSMENT & PLAN NOTE
Chronic and stable condition   Has noticed improvement with his sleep which in turn he has noticed improvement with his CP and abd pain  Last visit we started him on 10 mg Elavil.   Tolerating dose, denies any current SE.   Will continue.

## 2021-10-19 ENCOUNTER — HOSPITAL ENCOUNTER (OUTPATIENT)
Facility: MEDICAL CENTER | Age: 26
End: 2021-10-19
Attending: NURSE PRACTITIONER
Payer: COMMERCIAL

## 2021-10-19 ENCOUNTER — OFFICE VISIT (OUTPATIENT)
Dept: MEDICAL GROUP | Facility: PHYSICIAN GROUP | Age: 26
End: 2021-10-19
Payer: COMMERCIAL

## 2021-10-19 VITALS
TEMPERATURE: 98.4 F | HEART RATE: 94 BPM | SYSTOLIC BLOOD PRESSURE: 122 MMHG | BODY MASS INDEX: 26.41 KG/M2 | HEIGHT: 75 IN | DIASTOLIC BLOOD PRESSURE: 84 MMHG | OXYGEN SATURATION: 97 % | RESPIRATION RATE: 16 BRPM | WEIGHT: 212.4 LBS

## 2021-10-19 DIAGNOSIS — N50.812 TESTICULAR PAIN, LEFT: ICD-10-CM

## 2021-10-19 LAB
APPEARANCE UR: CLEAR
BILIRUB UR STRIP-MCNC: NEGATIVE MG/DL
COLOR UR AUTO: NORMAL
GLUCOSE UR STRIP.AUTO-MCNC: NEGATIVE MG/DL
KETONES UR STRIP.AUTO-MCNC: NEGATIVE MG/DL
LEUKOCYTE ESTERASE UR QL STRIP.AUTO: NEGATIVE
NITRITE UR QL STRIP.AUTO: NEGATIVE
PH UR STRIP.AUTO: 6 [PH] (ref 5–8)
PROT UR QL STRIP: NEGATIVE MG/DL
RBC UR QL AUTO: NORMAL
SP GR UR STRIP.AUTO: 1.01
UROBILINOGEN UR STRIP-MCNC: NORMAL MG/DL

## 2021-10-19 PROCEDURE — 87086 URINE CULTURE/COLONY COUNT: CPT

## 2021-10-19 PROCEDURE — 87591 N.GONORRHOEAE DNA AMP PROB: CPT

## 2021-10-19 PROCEDURE — 87491 CHLMYD TRACH DNA AMP PROBE: CPT

## 2021-10-19 PROCEDURE — 99214 OFFICE O/P EST MOD 30 MIN: CPT | Performed by: NURSE PRACTITIONER

## 2021-10-19 PROCEDURE — 81002 URINALYSIS NONAUTO W/O SCOPE: CPT | Performed by: NURSE PRACTITIONER

## 2021-10-19 RX ORDER — DOXYCYCLINE HYCLATE 100 MG
100 TABLET ORAL 2 TIMES DAILY
Qty: 20 TABLET | Refills: 0 | Status: SHIPPED | OUTPATIENT
Start: 2021-10-19 | End: 2021-10-29

## 2021-10-19 ASSESSMENT — ENCOUNTER SYMPTOMS
FEVER: 0
VOMITING: 0
CHILLS: 0
FLANK PAIN: 0
ABDOMINAL PAIN: 1
CONSTIPATION: 0
BLOOD IN STOOL: 0
NAUSEA: 0
DIARRHEA: 0

## 2021-10-19 ASSESSMENT — FIBROSIS 4 INDEX: FIB4 SCORE: 0.46

## 2021-10-20 DIAGNOSIS — N50.812 TESTICULAR PAIN, LEFT: ICD-10-CM

## 2021-10-20 NOTE — ASSESSMENT & PLAN NOTE
Acute uncomplicated  History of epididymitis in college  History of undescended testicle as a child, inguinal hernia,testicular torsion, chlamida/gonorrhea and was treated in college  Onset 1 week ago  Feels like a lump at bottom of left testicle  Pain- generalized over testicle   5-6/10 sharp pain with palpation  Dull cramping sensation to left lower quad  Denies chills, fevers, n/v

## 2021-10-20 NOTE — PROGRESS NOTES
CC:  Chief Complaint   Patient presents with   • Testicle Pain     lump x1wk left       HISTORY OF PRESENT ILLNESS: Patient is a 25 y.o. male established patient presenting with      Testicular pain, left  Acute uncomplicated  History of epididymitis in college  History of undescended testicle as a child, inguinal hernia,testicular torsion, chlamida/gonorrhea and was treated in college  Onset 1 week ago  Feels like a lump at bottom of left testicle  Pain- generalized over testicle   5-6/10 sharp pain with palpation  Dull cramping sensation to left lower quad  Denies chills, fevers, n/v        Patient Active Problem List    Diagnosis Date Noted   • Testicular pain, left 10/19/2021   • Diffuse pain 09/23/2021   • Spleen enlarged 09/08/2021   • Anxiety about health 07/13/2021   • Depression 07/13/2021   • MARY positive 07/13/2021   • 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    Current Outpatient Medications   Medication Sig Dispense Refill   • Omega-3 Fatty Acids (FISH OIL) 1000 MG Cap capsule Take 1,000 mg by mouth every day.     • amitriptyline (ELAVIL) 10 MG Tab Take 1 Tablet by mouth at bedtime. 90 Tablet 0   • aspirin (ASA) 81 MG Chew Tab chewable tablet Chew 81 mg every day.     • therapeutic multivitamin-minerals (THERAGRAN-M) Tab Take 1 Tablet by mouth every day.     • Ascorbic Acid (VITAMIN C) 1000 MG Tab Take  by mouth every day.     • cyanocobalamin (VITAMIN B-12) 100 MCG Tab Take 100 mcg by mouth every day.       No current facility-administered medications for this visit.       Social History     Tobacco Use   • Smoking status: Former Smoker   • Smokeless tobacco: Former User     Types: Chew     Quit date: 1/29/2019   Vaping Use   • Vaping Use: Never used  "  Substance Use Topics   • Alcohol use: Yes     Alcohol/week: 2.4 oz     Types: 4 Cans of beer per week     Comment: 2x a week   • Drug use: Yes     Types: Marijuana, Oral     Comment: 2 times a month     Social History     Social History Narrative   • Not on file       Family History   Problem Relation Age of Onset   • No Known Problems Mother    • Other Father         Review of Systems   Constitutional: Negative for chills, fever and malaise/fatigue.   Cardiovascular: Negative for chest pain.   Gastrointestinal: Positive for abdominal pain. Negative for blood in stool, constipation, diarrhea, nausea and vomiting.        Intermittent LLQ abd pain   Genitourinary: Negative for dysuria, flank pain, frequency, hematuria and urgency.        Left testicle pain with touch x1 week              - NOTE: All other systems reviewed and are negative, except as in HPI.      Exam:    /84   Pulse 94   Temp 36.9 °C (98.4 °F) (Temporal)   Resp 16   Ht 1.905 m (6' 3\")   Wt 96.3 kg (212 lb 6.4 oz)   SpO2 97%  Body mass index is 26.55 kg/m².    General: Alert, pleasant, NAD  EYES:   PERRL, EOMI, no icterus or pallor.  Conjunctivae and lids normal.   HENT:  Normocephalic.  External ears normal.  Heart: Regular rate and rhythm.    Respiratory: Normal respiratory effort.  Clear to auscultation bilaterally.  Abdomen: Non-distended, soft, tender LLQ to palpation, no guarding/rebound. Bowel sounds present.  No hepatosplenomegaly.  No hernias.  : pain with palpation to left testicle. No swelling, erythematous, penile discharge noted. No hydrocele. Positive cremasteric reflex. Negative phren sign  Skin: Warm, dry, no rashes.  Musculoskeletal: Gait is normal.  Moves all extremities well.    Extremities: No clubbing, cyanosis or edema noted.   Neurological: No tremors, sensation grossly intact, tone/strength normal, gait is normal, CN2-12 intact.  Psych:  Affect/mood is normal, judgement is good, memory is intact, grooming is " appropriate.      Assessment/Plan:  1. Testicular pain, left  - POCT Urinalysis  Results for VALERIA KUO (MRN [id number]) as of 10/19/2021 18:21   Ref. Range 10/19/2021 17:34   POC Color Latest Ref Range: Negative  light yellow   POC Appearance Latest Ref Range: Negative  clear   POC Specific Gravity Latest Ref Range: <1.005 - >1.030  1.010   POC Urine PH Latest Ref Range: 5.0 - 8.0  6.0   POC Glucose Latest Ref Range: Negative mg/dL negative   POC Ketones Latest Ref Range: Negative mg/dL negative   POC Protein Latest Ref Range: Negative mg/dL negative   POC Nitrites Latest Ref Range: Negative  negative   POC Leukocyte Esterase Latest Ref Range: Negative  negative   POC Blood Latest Ref Range: Negative  trace-lysed   POC Bilirubin Latest Ref Range: Negative mg/dL negative   POC Urobiligen Latest Ref Range: Negative (0.2) mg/dL 0.2 E.U./dL     - Chlamydia/GC PCR Urine Or Swab; Future  - URINE CULTURE(NEW); Future  - cefTRIAXone (ROCEPHIN) 500 mg, lidocaine (XYLOCAINE) 1 % 1.8 mL for IM use  - doxycycline (VIBRAMYCIN) 100 MG Tab; Take 1 Tablet by mouth 2 times a day for 10 days.  Dispense: 20 Tablet; Refill: 0  Will medicate pt prophylactic for STI pending results. If negative and continued pain will order US and possible Urology consult       Discussed with patient possible alternative diagnoses, patient is to take all medications as prescribed.     If symptoms persist FU w/PCP, if symptoms worsen go to emergency room.     If experiencing any side effects from prescribed medications reports to the office immediately or go to emergency room.    Reviewed indication, dosage, usage and potential adverse effects of prescribed medications.     Reviewed risks and benefits of treatment plan. Patient verbalizes understanding of all instruction and verbally agrees to plan.      No follow-ups on file.    My total time spent caring for the patient on the day of the encounter was 30 minutes.   This does not include time  spent on separately billable procedures/tests.     Please note that this dictation was created using voice recognition software. I have made every reasonable attempt to correct obvious errors, but I expect that there are errors of grammar and possibly content that I did not discover before finalizing the note.

## 2021-10-22 ENCOUNTER — TELEPHONE (OUTPATIENT)
Dept: MEDICAL GROUP | Facility: PHYSICIAN GROUP | Age: 26
End: 2021-10-22

## 2021-10-22 DIAGNOSIS — R10.9 LEFT FLANK DISCOMFORT: ICD-10-CM

## 2021-10-22 LAB
C TRACH DNA SPEC QL NAA+PROBE: NEGATIVE
N GONORRHOEA DNA SPEC QL NAA+PROBE: NEGATIVE
SPECIMEN SOURCE: NORMAL

## 2021-10-22 NOTE — TELEPHONE ENCOUNTER
PC made to pt regarding results of his urine test. Ok to dc doxycycline.     Continues to have left flank discomfort, no f/c/n/v.  Will order US renal.

## 2021-10-23 LAB
BACTERIA UR CULT: NORMAL
SIGNIFICANT IND 70042: NORMAL
SITE SITE: NORMAL
SOURCE SOURCE: NORMAL

## 2021-11-01 ENCOUNTER — HOSPITAL ENCOUNTER (OUTPATIENT)
Dept: RADIOLOGY | Facility: MEDICAL CENTER | Age: 26
End: 2021-11-01
Attending: NURSE PRACTITIONER
Payer: COMMERCIAL

## 2021-11-01 DIAGNOSIS — R10.9 LEFT FLANK DISCOMFORT: ICD-10-CM

## 2021-11-01 PROCEDURE — 76775 US EXAM ABDO BACK WALL LIM: CPT

## 2021-11-04 DIAGNOSIS — R31.9 HEMATURIA, UNSPECIFIED TYPE: ICD-10-CM

## 2021-11-11 ENCOUNTER — HOSPITAL ENCOUNTER (OUTPATIENT)
Dept: LAB | Facility: MEDICAL CENTER | Age: 26
End: 2021-11-11
Attending: NURSE PRACTITIONER
Payer: COMMERCIAL

## 2021-11-11 DIAGNOSIS — R31.9 HEMATURIA, UNSPECIFIED TYPE: ICD-10-CM

## 2021-11-11 DIAGNOSIS — R52 DIFFUSE PAIN: ICD-10-CM

## 2021-11-11 LAB
APPEARANCE UR: CLEAR
BILIRUB UR QL STRIP.AUTO: NEGATIVE
COLOR UR: YELLOW
CRP SERPL HS-MCNC: 0.4 MG/L (ref 0–3)
GLUCOSE UR STRIP.AUTO-MCNC: NEGATIVE MG/DL
KETONES UR STRIP.AUTO-MCNC: NEGATIVE MG/DL
LEUKOCYTE ESTERASE UR QL STRIP.AUTO: NEGATIVE
MICRO URNS: NORMAL
NITRITE UR QL STRIP.AUTO: NEGATIVE
PH UR STRIP.AUTO: 6 [PH] (ref 5–8)
PROT UR QL STRIP: NEGATIVE MG/DL
RBC UR QL AUTO: NEGATIVE
SP GR UR STRIP.AUTO: 1.02
UROBILINOGEN UR STRIP.AUTO-MCNC: 0.2 MG/DL

## 2021-11-11 PROCEDURE — 86141 C-REACTIVE PROTEIN HS: CPT

## 2021-11-11 PROCEDURE — 36415 COLL VENOUS BLD VENIPUNCTURE: CPT

## 2021-11-11 PROCEDURE — 81003 URINALYSIS AUTO W/O SCOPE: CPT

## 2021-11-16 ENCOUNTER — OFFICE VISIT (OUTPATIENT)
Dept: MEDICAL GROUP | Facility: PHYSICIAN GROUP | Age: 26
End: 2021-11-16
Payer: COMMERCIAL

## 2021-11-16 VITALS
OXYGEN SATURATION: 97 % | TEMPERATURE: 98.6 F | DIASTOLIC BLOOD PRESSURE: 74 MMHG | HEART RATE: 82 BPM | RESPIRATION RATE: 16 BRPM | SYSTOLIC BLOOD PRESSURE: 118 MMHG | WEIGHT: 213.6 LBS | HEIGHT: 75 IN | BODY MASS INDEX: 26.56 KG/M2

## 2021-11-16 DIAGNOSIS — R10.32 LEFT LOWER QUADRANT ABDOMINAL PAIN: ICD-10-CM

## 2021-11-16 PROBLEM — F32.9 MAJOR DEPRESSION, SINGLE EPISODE: Status: ACTIVE | Noted: 2021-11-16

## 2021-11-16 PROBLEM — F41.1 GENERALIZED ANXIETY DISORDER: Status: ACTIVE | Noted: 2021-11-16

## 2021-11-16 PROCEDURE — 99214 OFFICE O/P EST MOD 30 MIN: CPT | Performed by: NURSE PRACTITIONER

## 2021-11-16 ASSESSMENT — FIBROSIS 4 INDEX: FIB4 SCORE: 0.46

## 2021-11-16 NOTE — ASSESSMENT & PLAN NOTE
Chronic and exacerbated condition.  continues to have left lower quadrant pain that is 3 out of 10 dull, cramping, and annoyance.    ADLs are not compromised.    Since the beginning of October he has only had 2 to 3 days of no symptoms.   2 weeks ago he did have constipation but he is starting to take MiraLAX which does seem to be helping.   Denies any blood in stool, mucus.  States that originally when this started a few months ago it was in his left upper quadrant moved to his right lower quadrant and now left lower quadrant.    Has had numerous imaging studies-abdominal x-ray, chest x-ray, US complete abdomen, US spleen, US renal, CTA chest.  Labs from 2/20/2021 and 9/20/2021 as well as negative H. Pylori.  All imaging and labs were negative  Denies any nausea vomiting, abdominal distention, chills fever  Denies any NSAID use, alcohol 7-10 drinks a week of wine.  Denies any fullness or early satiation or epigastric pain, burning sensation.

## 2021-11-17 NOTE — PROGRESS NOTES
CC:  Chief Complaint   Patient presents with   • Follow-Up   • Lab Results       HISTORY OF PRESENT ILLNESS: Patient is a 25 y.o. male established patient presenting follow up labs and continued LLQ pain      Left lower quadrant abdominal pain  Chronic and exacerbated condition.  continues to have left lower quadrant pain that is 3 out of 10 dull, cramping, and annoyance.    ADLs are not compromised.    Since the beginning of October he has only had 2 to 3 days of no symptoms.   2 weeks ago he did have constipation but he is starting to take MiraLAX which does seem to be helping.   Denies any blood in stool, mucus.  States that originally when this started a few months ago it was in his left upper quadrant moved to his right lower quadrant and now left lower quadrant.    Has had numerous imaging studies-abdominal x-ray, chest x-ray, US complete abdomen, US spleen, US renal, CTA chest.  Labs from 2/20/2021 and 9/20/2021 as well as negative H. Pylori.  All imaging and labs were negative  Denies any nausea vomiting, abdominal distention, chills fever  Denies any NSAID use, alcohol 7-10 drinks a week of wine.  Denies any fullness or early satiation or epigastric pain, burning sensation.      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    Current Outpatient Medications   Medication Sig Dispense Refill  "  • Omega-3 Fatty Acids (FISH OIL) 1000 MG Cap capsule Take 1,000 mg by mouth every day.     • amitriptyline (ELAVIL) 10 MG Tab Take 1 Tablet by mouth at bedtime. 90 Tablet 0   • aspirin (ASA) 81 MG Chew Tab chewable tablet Chew 81 mg every day.     • therapeutic multivitamin-minerals (THERAGRAN-M) Tab Take 1 Tablet by mouth every day.     • Ascorbic Acid (VITAMIN C) 1000 MG Tab Take  by mouth every day.     • cyanocobalamin (VITAMIN B-12) 100 MCG Tab Take 100 mcg by mouth every day.       No current facility-administered medications for this visit.       Social History     Tobacco Use   • Smoking status: Former Smoker   • Smokeless tobacco: Former User     Types: Chew     Quit date: 1/29/2019   Vaping Use   • Vaping Use: Never used   Substance Use Topics   • Alcohol use: Yes     Alcohol/week: 2.4 oz     Types: 4 Cans of beer per week     Comment: 2x a week   • Drug use: Yes     Types: Marijuana, Oral     Comment: 2 times a month     Social History     Social History Narrative   • Not on file       Family History   Problem Relation Age of Onset   • No Known Problems Mother    • Other Father         ROS   See HPI    Exam:    /74   Pulse 82   Temp 37 °C (98.6 °F) (Temporal)   Resp 16   Ht 1.905 m (6' 3\")   Wt 96.9 kg (213 lb 9.6 oz)   SpO2 97%  Body mass index is 26.7 kg/m².    General: Alert, pleasant, NAD  EYES:   PERRL, EOMI, no icterus or pallor.  Conjunctivae and lids normal.   HENT:  Normocephalic.  External ears normal.   Respiratory: Normal respiratory effort.   Skin: Warm, dry, no rashes.  Musculoskeletal: Gait is normal.  Moves all extremities well.    Extremities: No clubbing, cyanosis or edema noted.   Neurological: No tremors, sensation grossly intact, tone/strength normal, gait is normal, CN2-12 intact.  Psych:  Affect/mood is normal, judgement is good, memory is intact, grooming is appropriate.      LABS: 11/11/2021: Results reviewed and discussed with the patient, questions " answered.    Assessment/Plan:  1. Left lower quadrant abdominal pain  - Referral to Gastroenterology       Discussed with patient possible alternative diagnoses, patient is to take all medications as prescribed.     If symptoms persist FU w/PCP, if symptoms worsen go to emergency room.     If experiencing any side effects from prescribed medications reports to the office immediately or go to emergency room.    Reviewed indication, dosage, usage and potential adverse effects of prescribed medications.     Reviewed risks and benefits of treatment plan. Patient verbalizes understanding of all instruction and verbally agrees to plan.      No follow-ups on file.    My total time spent caring for the patient on the day of the encounter was 30 minutes.   This does not include time spent on separately billable procedures/tests.     Please note that this dictation was created using voice recognition software. I have made every reasonable attempt to correct obvious errors, but I expect that there are errors of grammar and possibly content that I did not discover before finalizing the note.

## 2021-11-23 ENCOUNTER — NON-PROVIDER VISIT (OUTPATIENT)
Dept: MEDICAL GROUP | Facility: PHYSICIAN GROUP | Age: 26
End: 2021-11-23
Payer: COMMERCIAL

## 2021-11-23 DIAGNOSIS — Z23 NEED FOR VACCINATION: ICD-10-CM

## 2021-11-23 PROCEDURE — 90471 IMMUNIZATION ADMIN: CPT | Performed by: NURSE PRACTITIONER

## 2021-11-23 PROCEDURE — 90651 9VHPV VACCINE 2/3 DOSE IM: CPT | Performed by: NURSE PRACTITIONER

## 2021-11-24 NOTE — NON-PROVIDER
"Remington Edmonds is a 25 y.o. male here for a non-provider visit for:   HPV 3 of 3    Reason for immunization: continue or complete series started at the office  Immunization records indicate need for vaccine: Yes, confirmed with Epic  Minimum interval has been met for this vaccine: Yes  ABN completed: Yes    VIS Dated  8/6/2021 was given to patient: Yes  All IAC Questionnaire questions were answered \"No.\"    Patient tolerated injection and no adverse effects were observed or reported: Yes    Pt scheduled for next dose in series: Not Indicated  "